# Patient Record
Sex: FEMALE | Race: BLACK OR AFRICAN AMERICAN | NOT HISPANIC OR LATINO | Employment: UNEMPLOYED | URBAN - METROPOLITAN AREA
[De-identification: names, ages, dates, MRNs, and addresses within clinical notes are randomized per-mention and may not be internally consistent; named-entity substitution may affect disease eponyms.]

---

## 2024-11-15 ENCOUNTER — CLINICAL SUPPORT (OUTPATIENT)
Dept: EMERGENCY MEDICINE | Facility: HOSPITAL | Age: 24
End: 2024-11-15
Payer: MEDICAID

## 2024-11-15 ENCOUNTER — HOSPITAL ENCOUNTER (OUTPATIENT)
Facility: HOSPITAL | Age: 24
Setting detail: OBSERVATION
Discharge: PSYCHIATRIC OR SUBSTANCE USE FACILITY | End: 2024-11-16
Attending: STUDENT IN AN ORGANIZED HEALTH CARE EDUCATION/TRAINING PROGRAM | Admitting: STUDENT IN AN ORGANIZED HEALTH CARE EDUCATION/TRAINING PROGRAM
Payer: MEDICAID

## 2024-11-15 ENCOUNTER — APPOINTMENT (OUTPATIENT)
Dept: RADIOLOGY | Facility: HOSPITAL | Age: 24
End: 2024-11-15
Payer: MEDICAID

## 2024-11-15 VITALS
HEART RATE: 70 BPM | SYSTOLIC BLOOD PRESSURE: 101 MMHG | RESPIRATION RATE: 16 BRPM | DIASTOLIC BLOOD PRESSURE: 57 MMHG | OXYGEN SATURATION: 99 % | TEMPERATURE: 98.1 F

## 2024-11-15 DIAGNOSIS — T14.91XA SUICIDE ATTEMPT (MULTI): Primary | ICD-10-CM

## 2024-11-15 LAB
ALBUMIN SERPL BCP-MCNC: 4.3 G/DL (ref 3.4–5)
ALP SERPL-CCNC: 52 U/L (ref 33–110)
ALT SERPL W P-5'-P-CCNC: 7 U/L (ref 7–45)
AMPHETAMINES UR QL SCN: NORMAL
ANION GAP SERPL CALC-SCNC: 14 MMOL/L (ref 10–20)
APAP SERPL-MCNC: <10 UG/ML
AST SERPL W P-5'-P-CCNC: 11 U/L (ref 9–39)
BARBITURATES UR QL SCN: NORMAL
BASOPHILS # BLD AUTO: 0.04 X10*3/UL (ref 0–0.1)
BASOPHILS NFR BLD AUTO: 1.1 %
BENZODIAZ UR QL SCN: NORMAL
BILIRUB SERPL-MCNC: 0.4 MG/DL (ref 0–1.2)
BUN SERPL-MCNC: 17 MG/DL (ref 6–23)
BZE UR QL SCN: NORMAL
CALCIUM SERPL-MCNC: 9.3 MG/DL (ref 8.6–10.6)
CANNABINOIDS UR QL SCN: NORMAL
CHLORIDE SERPL-SCNC: 108 MMOL/L (ref 98–107)
CO2 SERPL-SCNC: 23 MMOL/L (ref 21–32)
CREAT SERPL-MCNC: 0.82 MG/DL (ref 0.5–1.05)
EGFRCR SERPLBLD CKD-EPI 2021: >90 ML/MIN/1.73M*2
EOSINOPHIL # BLD AUTO: 0.1 X10*3/UL (ref 0–0.7)
EOSINOPHIL NFR BLD AUTO: 2.7 %
ERYTHROCYTE [DISTWIDTH] IN BLOOD BY AUTOMATED COUNT: 12.9 % (ref 11.5–14.5)
ETHANOL SERPL-MCNC: <10 MG/DL
FENTANYL+NORFENTANYL UR QL SCN: NORMAL
GLUCOSE SERPL-MCNC: 76 MG/DL (ref 74–99)
HCT VFR BLD AUTO: 39 % (ref 36–46)
HGB BLD-MCNC: 12.9 G/DL (ref 12–16)
HOLD SPECIMEN: NORMAL
HOLD SPECIMEN: NORMAL
IMM GRANULOCYTES # BLD AUTO: 0.01 X10*3/UL (ref 0–0.7)
IMM GRANULOCYTES NFR BLD AUTO: 0.3 % (ref 0–0.9)
LYMPHOCYTES # BLD AUTO: 1.95 X10*3/UL (ref 1.2–4.8)
LYMPHOCYTES NFR BLD AUTO: 52.6 %
MCH RBC QN AUTO: 28.8 PG (ref 26–34)
MCHC RBC AUTO-ENTMCNC: 33.1 G/DL (ref 32–36)
MCV RBC AUTO: 87 FL (ref 80–100)
METHADONE UR QL SCN: NORMAL
MONOCYTES # BLD AUTO: 0.41 X10*3/UL (ref 0.1–1)
MONOCYTES NFR BLD AUTO: 11.1 %
NEUTROPHILS # BLD AUTO: 1.2 X10*3/UL (ref 1.2–7.7)
NEUTROPHILS NFR BLD AUTO: 32.2 %
NRBC BLD-RTO: 0 /100 WBCS (ref 0–0)
OPIATES UR QL SCN: NORMAL
OXYCODONE+OXYMORPHONE UR QL SCN: NORMAL
PCP UR QL SCN: NORMAL
PLATELET # BLD AUTO: 250 X10*3/UL (ref 150–450)
POTASSIUM SERPL-SCNC: 3.9 MMOL/L (ref 3.5–5.3)
PREGNANCY TEST URINE, POC: NEGATIVE
PROT SERPL-MCNC: 7.3 G/DL (ref 6.4–8.2)
RBC # BLD AUTO: 4.48 X10*6/UL (ref 4–5.2)
SALICYLATES SERPL-MCNC: <3 MG/DL
SODIUM SERPL-SCNC: 141 MMOL/L (ref 136–145)
TSH SERPL-ACNC: 2.2 MIU/L (ref 0.44–3.98)
WBC # BLD AUTO: 3.7 X10*3/UL (ref 4.4–11.3)

## 2024-11-15 PROCEDURE — 36415 COLL VENOUS BLD VENIPUNCTURE: CPT | Performed by: PHYSICIAN ASSISTANT

## 2024-11-15 PROCEDURE — G0378 HOSPITAL OBSERVATION PER HR: HCPCS

## 2024-11-15 PROCEDURE — 85025 COMPLETE CBC W/AUTO DIFF WBC: CPT | Performed by: PHYSICIAN ASSISTANT

## 2024-11-15 PROCEDURE — 80143 DRUG ASSAY ACETAMINOPHEN: CPT | Performed by: PHYSICIAN ASSISTANT

## 2024-11-15 PROCEDURE — 71046 X-RAY EXAM CHEST 2 VIEWS: CPT | Performed by: RADIOLOGY

## 2024-11-15 PROCEDURE — 84443 ASSAY THYROID STIM HORMONE: CPT | Performed by: STUDENT IN AN ORGANIZED HEALTH CARE EDUCATION/TRAINING PROGRAM

## 2024-11-15 PROCEDURE — 81025 URINE PREGNANCY TEST: CPT | Performed by: PHYSICIAN ASSISTANT

## 2024-11-15 PROCEDURE — 80307 DRUG TEST PRSMV CHEM ANLYZR: CPT | Performed by: PHYSICIAN ASSISTANT

## 2024-11-15 PROCEDURE — 93010 ELECTROCARDIOGRAM REPORT: CPT | Performed by: STUDENT IN AN ORGANIZED HEALTH CARE EDUCATION/TRAINING PROGRAM

## 2024-11-15 PROCEDURE — 71046 X-RAY EXAM CHEST 2 VIEWS: CPT

## 2024-11-15 PROCEDURE — 93005 ELECTROCARDIOGRAM TRACING: CPT

## 2024-11-15 PROCEDURE — 99285 EMERGENCY DEPT VISIT HI MDM: CPT | Performed by: PHYSICIAN ASSISTANT

## 2024-11-15 PROCEDURE — 80053 COMPREHEN METABOLIC PANEL: CPT | Performed by: PHYSICIAN ASSISTANT

## 2024-11-15 PROCEDURE — 99285 EMERGENCY DEPT VISIT HI MDM: CPT | Mod: 25

## 2024-11-15 SDOH — HEALTH STABILITY: MENTAL HEALTH: ACTIVE SUICIDAL IDEATION WITH SOME INTENT TO ACT, WITHOUT SPECIFIC PLAN (PAST 1 MONTH): YES

## 2024-11-15 SDOH — HEALTH STABILITY: MENTAL HEALTH: SUICIDE ASSESSMENT: ADULT (C-SSRS)

## 2024-11-15 SDOH — HEALTH STABILITY: MENTAL HEALTH: NON-SPECIFIC ACTIVE SUICIDAL THOUGHTS (PAST 1 MONTH): YES

## 2024-11-15 SDOH — HEALTH STABILITY: MENTAL HEALTH
DEPRESSION SYMPTOMS: APPETITE CHANGE;SLEEP DISTURBANCE;FEELINGS OF HELPLESSNESS;FEELINGS OF HOPELESSESS;FEELINGS OF WORTHLESSNESS

## 2024-11-15 SDOH — HEALTH STABILITY: MENTAL HEALTH: HAVE YOU ACTUALLY HAD ANY THOUGHTS OF KILLING YOURSELF?: YES

## 2024-11-15 SDOH — HEALTH STABILITY: MENTAL HEALTH: HOW DID YOU TRY TO KILL YOURSELF?: DROWNING

## 2024-11-15 SDOH — HEALTH STABILITY: MENTAL HEALTH: WISH TO BE DEAD (PAST 1 MONTH): YES

## 2024-11-15 SDOH — HEALTH STABILITY: MENTAL HEALTH: WAS THIS WITHIN THE PAST THREE MONTHS?: YES

## 2024-11-15 SDOH — HEALTH STABILITY: MENTAL HEALTH: IN THE PAST FEW WEEKS, HAVE YOU WISHED YOU WERE DEAD?: YES

## 2024-11-15 SDOH — HEALTH STABILITY: MENTAL HEALTH
HAVE YOU STARTED TO WORK OUT OR WORKED OUT THE DETAILS OF HOW TO KILL YOURSELF? DO YOU INTENT TO CARRY OUT THIS PLAN?: YES

## 2024-11-15 SDOH — HEALTH STABILITY: MENTAL HEALTH: HAVE YOU EVER DONE ANYTHING, STARTED TO DO ANYTHING, OR PREPARED TO DO ANYTHING TO END YOUR LIFE?: YES

## 2024-11-15 SDOH — HEALTH STABILITY: MENTAL HEALTH: IN THE PAST WEEK, HAVE YOU BEEN HAVING THOUGHTS ABOUT KILLING YOURSELF?: YES

## 2024-11-15 SDOH — HEALTH STABILITY: MENTAL HEALTH

## 2024-11-15 SDOH — HEALTH STABILITY: MENTAL HEALTH: BEHAVIORAL HEALTH(WDL): EXCEPTIONS TO WDL

## 2024-11-15 SDOH — HEALTH STABILITY: MENTAL HEALTH: HAVE YOU BEEN THINKING ABOUT HOW YOU MIGHT DO THIS?: YES

## 2024-11-15 SDOH — HEALTH STABILITY: MENTAL HEALTH: HAVE YOU EVER TRIED TO KILL YOURSELF?: YES

## 2024-11-15 SDOH — HEALTH STABILITY: MENTAL HEALTH: BEHAVIORS/MOOD: CALM;SAD

## 2024-11-15 SDOH — HEALTH STABILITY: MENTAL HEALTH: SUICIDAL BEHAVIOR (LIFETIME): YES

## 2024-11-15 SDOH — HEALTH STABILITY: MENTAL HEALTH: HAVE YOU WISHED YOU WERE DEAD OR WISHED YOU COULD GO TO SLEEP AND NOT WAKE UP?: YES

## 2024-11-15 SDOH — HEALTH STABILITY: MENTAL HEALTH: ARE YOU HAVING THOUGHTS OF KILLING YOURSELF RIGHT NOW?: YES

## 2024-11-15 SDOH — SOCIAL STABILITY: SOCIAL NETWORK: PARENT/GUARDIAN/SIGNIFICANT OTHER INVOLVEMENT: ATTENTIVE TO PATIENT NEEDS

## 2024-11-15 SDOH — HEALTH STABILITY: MENTAL HEALTH: ANXIETY SYMPTOMS: GENERALIZED

## 2024-11-15 SDOH — ECONOMIC STABILITY: HOUSING INSECURITY: FEELS SAFE LIVING IN HOME: YES

## 2024-11-15 SDOH — HEALTH STABILITY: MENTAL HEALTH: SUICIDAL BEHAVIOR (3 MONTHS): YES

## 2024-11-15 SDOH — HEALTH STABILITY: MENTAL HEALTH: IN THE PAST FEW WEEKS, HAVE YOU FELT THAT YOU OR YOUR FAMILY WOULD BE BETTER OFF IF YOU WERE DEAD?: YES

## 2024-11-15 SDOH — HEALTH STABILITY: MENTAL HEALTH: HAVE YOU HAD THESE THOUGHTS AND HAD SOME INTENTION OF ACTING ON THEM?: YES

## 2024-11-15 SDOH — HEALTH STABILITY: MENTAL HEALTH: ACTIVE SUICIDAL IDEATION WITH SPECIFIC PLAN AND INTENT (PAST 1 MONTH): YES

## 2024-11-15 SDOH — HEALTH STABILITY: MENTAL HEALTH: FOR HIGH RISK PATIENTS: 1:1 PATIENT OBSERVER AT ALL TIMES

## 2024-11-15 ASSESSMENT — LIFESTYLE VARIABLES
SUBSTANCE_ABUSE_PAST_12_MONTHS: NO
HAVE YOU EVER FELT YOU SHOULD CUT DOWN ON YOUR DRINKING: NO
TOTAL SCORE: 0
PRESCIPTION_ABUSE_PAST_12_MONTHS: NO
EVER HAD A DRINK FIRST THING IN THE MORNING TO STEADY YOUR NERVES TO GET RID OF A HANGOVER: NO
HAVE PEOPLE ANNOYED YOU BY CRITICIZING YOUR DRINKING: NO
EVER FELT BAD OR GUILTY ABOUT YOUR DRINKING: NO

## 2024-11-15 ASSESSMENT — PAIN SCALES - GENERAL
PAINLEVEL_OUTOF10: 0 - NO PAIN
PAINLEVEL_OUTOF10: 0 - NO PAIN

## 2024-11-15 ASSESSMENT — COLUMBIA-SUICIDE SEVERITY RATING SCALE - C-SSRS
5. HAVE YOU STARTED TO WORK OUT OR WORKED OUT THE DETAILS OF HOW TO KILL YOURSELF? DO YOU INTEND TO CARRY OUT THIS PLAN?: YES
4. HAVE YOU HAD THESE THOUGHTS AND HAD SOME INTENTION OF ACTING ON THEM?: YES
1. IN THE PAST MONTH, HAVE YOU WISHED YOU WERE DEAD OR WISHED YOU COULD GO TO SLEEP AND NOT WAKE UP?: YES
6. HAVE YOU EVER DONE ANYTHING, STARTED TO DO ANYTHING, OR PREPARED TO DO ANYTHING TO END YOUR LIFE?: YES
2. HAVE YOU ACTUALLY HAD ANY THOUGHTS OF KILLING YOURSELF?: YES
6. HAVE YOU EVER DONE ANYTHING, STARTED TO DO ANYTHING, OR PREPARED TO DO ANYTHING TO END YOUR LIFE?: YES

## 2024-11-15 ASSESSMENT — PAIN DESCRIPTION - PROGRESSION: CLINICAL_PROGRESSION: NOT CHANGED

## 2024-11-15 ASSESSMENT — PAIN - FUNCTIONAL ASSESSMENT: PAIN_FUNCTIONAL_ASSESSMENT: 0-10

## 2024-11-15 NOTE — ED PROVIDER NOTES
"Emergency Department Encounter  [facility] EMERGENCY MEDICINE    Patient: Chaparro Gonzalez  MRN: [id number]  : 2000  Date of Evaluation: 11/15/2024  ED Provider: Radha Mckeon PA-C      Chief Complaint       Chief Complaint   Patient presents with    Suicidal     HPI    Chaparro Gonzalez is a 24 y.o. female who presents to the emergency department presenting for suicide attempt.  Patient believes that her girlfriend called 911 for her as she did not call for herself.  States that she had \"another mental health breakdown\".  Notes that she was very tearful last night, hermila a bath and attempted to drown herself in the bath, reports that she was unsuccessful.  States that she \"just could not do it\".  Also notes that she considered using razor blades but did not have access to this.  Denies any homicidal ideation, AVH.  Endorses that she has a previous suicide attempt at 21 years old after she attempted to overdose on medication.  Endorses that she has PMH of anxiety, depression and PTSD.  Does not currently take anything for her diagnoses.  Endorses that she occasionally drinks alcohol, last was 2 days ago.  Denies any illicit drug use.  Denies any chest pain, shortness of breath, cough, orthopnea, nausea or vomiting, numbness or tingling, syncope.    ROS:     Review of Systems  14 systems reviewed and otherwise acutely negative except as in the HPI.    Past History   History reviewed. No pertinent past medical history.  History reviewed. No pertinent surgical history.  Social History     Socioeconomic History    Marital status: Single       Medications/Allergies     Previous Medications    No medications on file     Not on File     Physical Exam       ED Triage Vitals [11/15/24 0930]   Temperature Heart Rate Respirations BP   36.5 °C (97.7 °F) 75 17 118/84      Pulse Ox Temp Source Heart Rate Source Patient Position   100 % Oral Monitor Sitting      BP Location FiO2 (%)     Left arm --   "       Physical Exam    Physical Exam:     VS: As documented in the triage note and EMR flowsheet from this visit were reviewed.    Appearance: Alert, oriented, in no acute distress. Tearful, cooperative.    Skin: Atraumatic. Warm, intact and dry. No lesions, rash, or petechiae.    ENT: Hearing grossly intact. Nares patent, mucus membranes moist. Pharynx clear, uvula midline and non-edematous. No tonsillar hypertrophy or exudates. Bilateral tonsillar pillars visualized and intact, non-obscured. No drooling, dysphagia or trismus. Voice non-muffled.    Neck: Supple, without meningismus. No lymphadenopathy.     Pulmonary: Clear bilaterally with good chest wall excursion. No rales, rhonchi or wheezing. No accessory muscle use or stridor. Nonlabored breathing, no supplemental oxygen.     Cardiac: Normal S1, S2 without murmur, rub, gallop or extrasystole.     Abdomen: Soft, nontender, active bowel sounds.    Musculoskeletal: Spontaneously moving all extremities without limitation. Extremities warm and well-perfused, capillary refill less than 2 seconds. Pulses full and equal.     Neurological:  Cranial nerves II through XII are grossly intact,  Ambulating without assistance with steady gait, non-ataxic.    Psychiatric: Appropriate mood and affect. Kempt appearance.     Diagnostics   Labs:  Labs Reviewed   CBC WITH AUTO DIFFERENTIAL - Abnormal       Result Value    WBC 3.7 (*)     nRBC 0.0      RBC 4.48      Hemoglobin 12.9      Hematocrit 39.0      MCV 87      MCH 28.8      MCHC 33.1      RDW 12.9      Platelets 250      Neutrophils % 32.2      Immature Granulocytes %, Automated 0.3      Lymphocytes % 52.6      Monocytes % 11.1      Eosinophils % 2.7      Basophils % 1.1      Neutrophils Absolute 1.20      Immature Granulocytes Absolute, Automated 0.01      Lymphocytes Absolute 1.95      Monocytes Absolute 0.41      Eosinophils Absolute 0.10      Basophils Absolute 0.04     COMPREHENSIVE METABOLIC PANEL - Abnormal     Glucose 76      Sodium 141      Potassium 3.9      Chloride 108 (*)     Bicarbonate 23      Anion Gap 14      Urea Nitrogen 17      Creatinine 0.82      eGFR >90      Calcium 9.3      Albumin 4.3      Alkaline Phosphatase 52      Total Protein 7.3      AST 11      Bilirubin, Total 0.4      ALT 7     ACUTE TOXICOLOGY PANEL, BLOOD - Normal    Acetaminophen <10.0      Salicylate  <3      Alcohol <10     DRUG SCREEN,URINE   TSH WITH REFLEX TO FREE T4 IF ABNORMAL   POCT PREGNANCY, URINE     Radiographs:  XR chest 2 views   Final Result   1.  No evidence of acute cardiopulmonary process.                  MACRO:   None        Signed by: Michael Suarez 11/15/2024 10:30 AM   Dictation workstation:   [id number]        EKG #1 11/15/24 1213:  NSR. No STEMI. Normal Qtc interval.      ED Course   Visit Vitals  /84 (BP Location: Left arm, Patient Position: Sitting)   Pulse 75   Temp 36.5 °C (97.7 °F) (Oral)   Resp 17   SpO2 100%     Medications - No data to display    Medical Decision Making     ED Course as of 11/15/24 1346   Fri Nov 15, 2024   1136 XR chest 2 views  No pneumothorax, pneumonia, infiltrates [FN]   1218 Electrocardiogram, 12-lead  ECG shows normal sinus rhythm, vent rate 66, no significant ST segment elevation or T wave inversion.  Narrow QRS, normal QTc [FN]      ED Course User Index  [FN] Bran Disla MD         Diagnoses as of 11/15/24 1346   Suicide attempt (Multi)   Pt belongings secured, placed on continuous observation here. Calm + cooperative, no need for chemical or physical restraint. Basic labs and EKG obtained for medical clearance for EPAT evaluation.  Staffed with supervising physician, Bran Disla MD, who agrees with workup and treatment plan.  EKG NSR. Chest x-ray without acute cardiopulmonary processes. Labs stable, pending TSH add-on, urine sample.  EPAT has evaluated and reports patient does meet inpatient psychiatric admission criteria.  Signed out pending EPAT placement -  observation status initiated, psychiatric observation H&P entered.  **EPAT reports pt does not have medical insurance - will require medical clearance (urine) and then mobile crisis eval.    Final Impression      1. Suicide attempt (Multi)          DISPOSITION  Disposition: signed out  Patient condition is: Stable    Comment: Please note this report has been produced using speech recognition software and may contain errors related to that system including errors in grammar, punctuation, and spelling, as well as words and phrases that may be inappropriate.  If there are any questions or concerns please feel free to contact the dictating provider for clarification.    LUKE Waddell PA-C  11/15/24 [id number]

## 2024-11-15 NOTE — H&P
"Observation History and Physical  [facility] EMERGENCY MEDICINE           History of Present Illness     History provided by: Patient  Limitations to History: Mental Illness  External Records Reviewed: n/a    Patient History:  Chaparro Gonzalez is a 24 y.o. female who presented to the emergency department for suicidal ideation  / attempt last night. Patient believes that her girlfriend called 911 for her as she did not call for herself. States that she had \"another mental health breakdown\". Notes that she was very tearful last night, hermila a bath and attempted to drown herself in the bath, reports that she was unsuccessful. States that she \"just could not do it\". Also notes that she considered using razor blades but did not have access to this. Denies any homicidal ideation, AVH. Endorses that she has a previous suicide attempt at 21 years old after she attempted to overdose on medication. Endorses that she has PMH of anxiety, depression and PTSD. Does not currently take anything for her diagnoses. Endorses that she occasionally drinks alcohol, last was 2 days ago. Denies any illicit drug use. Denies any chest pain, shortness of breath, cough, orthopnea, nausea or vomiting, numbness or tingling, syncope.     EPAT has evaluated and reports patient does meet inpatient psychiatric admission criteria. Awaiting confirmation of medical insurance as well as [employer]/UDS for EPAT placement.    Chaparro Gonzalez was escalated to observation status. Observation was necessary as they continue to require treatment and monitoring of their psychiatric illness while awaiting inpatient behavioral health bed availability.    Physical Exam     Visit Vitals  /84 (BP Location: Left arm, Patient Position: Sitting)   Pulse 75   Temp 36.5 °C (97.7 °F) (Oral)   Resp 17   SpO2 100%       GENERAL:  The patient appears nourished and normally developed. Vital signs as documented.     PULMONARY:  Without any respiratory " distress. Able to speak full sentences, no accessory muscle use    CARDIAC: Warm and well perfused. No cyanosis.    MUSCULOSKELETAL:   Able to ambulate, Non edematous, with no obvious deformities.     SKIN: No pallor. Intact.    NEURO:  No obvious neurological deficits.  Able to follow commands.    Psych: tearful. Continues to endorse suicidal ideation without current plan. No objective signs of attempt on physical exam.      Impression and Plan     ED Course as of 11/15/24 1325   Fri Nov 15, 2024   1136 XR chest 2 views  No pneumothorax, pneumonia, infiltrates [FN]   1218 Electrocardiogram, 12-lead  ECG shows normal sinus rhythm, vent rate 66, no significant ST segment elevation or T wave inversion.  Narrow QRS, normal QTc [FN]      ED Course User Index  [FN] Bran Disla MD         Diagnoses as of 11/15/24 1325   Suicide attempt (Multi)        Chaparro Washington under observation status in [facility] EMERGENCY MEDICINE for psychiatric illness monitoring and treatment while awaiting inpatient behavioral health bed availability.   Emergency Psychiatric Assessment Team has been consulted. Case discussed with them and decision for inpatient hospitalization deemed necessary. Patient is medically clear at this time.     Home medication reconciliation was reviewed and restarted where clinically indicated.     Patient and Family updated on plan of care.     Radha Mckeon PA-C

## 2024-11-15 NOTE — PROGRESS NOTES
Observation Disposition Note  [facility] EMERGENCY MEDICINE             Subjective:       Chaparro Gonzalez has been under observation status in [facility] EMERGENCY MEDICINE for psychiatric illness monitoring and treatment while awaiting inpatient behavioral health bed availability.       Physical Exam     Visit Vitals  /84 (BP Location: Left arm, Patient Position: Sitting)   Pulse 75   Temp 36.5 °C (97.7 °F) (Oral)   Resp 17   SpO2 100%       GENERAL:  The patient appears nourished and normally developed. Vital signs as documented.     PULMONARY:  Without any respiratory distress. Able to speak full sentences, no accessory muscle use    CARDIAC: Warm and well perfused. No cyanosis.    MUSCULOSKELETAL:   Able to ambulate, Non edematous, with no obvious deformities.     SKIN: No pallor. Intact.    NEURO:  No obvious neurological deficits.  Able to follow commands.    Psych: Labile, withdrawn      Impresssion and Plan     ED Course as of 11/15/24 1726   Fri Nov 15, 2024   1136 XR chest 2 views  No pneumothorax, pneumonia, infiltrates [FN]   1218 Electrocardiogram, 12-lead  ECG shows normal sinus rhythm, vent rate 66, no significant ST segment elevation or T wave inversion.  Narrow QRS, normal QTc [FN]      ED Course User Index  [FN] Bran Disla MD         Diagnoses as of 11/15/24 1726   Suicide attempt (Multi)       In summary, Chaparro Gonzalez has been cared under observation in [facility] for psychiatric illness monitoring and treatment while awaiting inpatient behavioral health bed availability.     Emergency Psychiatric Assessment Team was consulted. Case discussed with them and decision for inpatient hospitalization deemed necessary.     Patient has been accepted at  [facility]    Total length of observation was 8 hours. Dr. Stephan Silvestre MD is the disposition attending.    Discharge Diagnosis  Suicide attempt  (Multi)    Elena Flores, APRN-CNP

## 2024-11-15 NOTE — ED TRIAGE NOTES
"Pt came in via squad with SI, pt states that she tried to drown herself last night and stated \"I couldn't bring myself to do it\" she then attempted to use a razor blade on her wrists and stated that she couldn't bring herself to do that either. Pt states having these thoughts for the past couple of weeks but they got worse yesterday.   "

## 2024-11-15 NOTE — PROGRESS NOTES
EPAT - Social Work Psychiatric Assessment    Arrival Details  Mode of Arrival: Ambulatory  Admission Source: Home  Admission Type: Involuntary  EPAT Assessment Start Date: 11/15/24  EPAT Assessment Start Time: 1300  Name of : Reuben Schroeder    History of Present Illness  Admission Reason: Suicide attempt  HPI: Patient is a 24 year old female who came to the ED via EMS. Her girlfriend called concerned after the patient reports she tried to kill herself last night. The patient reports she attempted to drown herself. She has a history of past attempts at age 21 and 22. A review of her Triage and Provider note was conducted.    SW Readmission Information   Readmission within 30 Days: No    Psychiatric Symptoms  Anxiety Symptoms: Generalized  Depression Symptoms: Appetite change, Sleep disturbance, Feelings of helplessness, Feelings of hopelessess, Feelings of worthlessness  Cesilia Symptoms: No problems reported or observed.    Psychosis Symptoms  Hallucination Type: No problems reported or observed.  Delusion Type: No problems reported or observed.    Additional Symptoms - Adult  Generalized Anxiety Disorder: Excessive anxiety/worry  Obsessive Compulsive Disorder: No problems reported or observed.  Panic Attack: No problems reported or observed.  Post Traumatic Stress Disorder: No problems reported or observed.  Review of Symptoms Comments:  (Please see above)    Past Psychiatric History/Meds/Treatments  Past Psychiatric History: Patient has a history of Depression and anxiety. She currently does not see any providers and does not take any medications. She denies any substance treatment history. She reports two prior suicide attempts and was hospitalized in [place] at that time.  Past Psychiatric Meds/Treatments: none  Past Violence/Victimization History: patient denies    Current Mental Health Contacts   Name/Phone Number: none   Last Appointment Date: none  Provider Name/Phone Number:  none  Provider Last Appointment Date: none    Support System:  (girlfriend)    Living Arrangement: Apartment    Home Safety  Feels Safe Living in Home: Yes         [place] Service/Education History  Current or Previous  Service: None  Education Level: Less than high school  History of Learning Problems: No  History of School Behavior Problems: No  School History: 10th grade    Social/Cultural History  Social History: Patient is her own guardian.  Important Activities: Family    Legal  Legal Concerns: none    Drug Screening  Have you used any substances (canabis, cocaine, heroin, hallucinogens, inhalants, etc.) in the past 12 months?: No  Have you used any prescription drugs other than prescribed in the past 12 months?: No  Is a toxicology screen needed?: No         Behavioral Health  Behavioral Health(WDL): Exceptions to WDL  Behaviors/Mood: Anxious  Affect: Inconsistent with mood  Parent/Guardian/Significant Other Involvement: No involvement    Orientation  Orientation Level: Oriented X4    General Appearance  Motor Activity: Unremarkable  Speech Pattern: Other (Comment)  General Attitude: Cooperative, Withdrawn  Appearance/Hygiene: Disheveled    Thought Process  Coherency: Other (Comment)  Content: Unremarkable  Delusions: Other (Comment)  Perception: Not altered  Hallucination: None  Judgment/Insight: Poor  Confusion: None  Cognition: Poor judgement         Risk Factors  Self Harm/Suicidal Ideation Plan: Suicide attempt  Previous Self Harm/Suicidal Plans: two prior attempts  Risk Factors: Unemployment    Violence Risk Assessment  Assessment of Violence: None noted  Thoughts of Harm to Others: No    Ability to Assess Risk Screen  Risk Screen - Ability to Assess: Other (Comment)  Ask Suicide-Screening Questions  1. In the past few weeks, have you wished you were dead?: Yes  2. In the past few weeks, have you felt that you or your family would be better off if you were dead?: Yes  3. In the past week, have  you been having thoughts about killing yourself?: Yes  4. Have you ever tried to kill yourself?: Yes  How did you try to kill yourself?: drowning  When did you try to kill yourself?: yesterday  5. Are you having thoughts of killing yourself right now?: Yes  Calculated Risk Score: Imminent Risk  [place] Suicide Severity Rating Scale (Screener/Recent Self-Report)  1. Wish to be Dead (Past 1 Month): Yes  2. Non-Specific Active Suicidal Thoughts (Past 1 Month): Yes  3. Active Suicidal Ideation with any Methods (Not Plan) Without Intent to Act (Past 1 Month): Yes  4. Active Suicidal Ideation with Some Intent to Act, Without Specific Plan (Past 1 Month): Yes  5. Active Suicidal Ideation with Specific Plan and Intent (Past 1 Month): Yes  6. Suicidal Behavior (Lifetime): Yes  6. Suicidal Behavior (3 Months): Yes  Calculated C-SSRS Risk Score (Lifetime/Recent): High Risk  Step 1: Risk Factors  Current & Past Psychiatric Dx: Mood disorder  Presenting Symptoms: Hopelessness or despair  Family History: Other (Comment)  Precipitants/Stressors: Triggering events leading to humiliation, shame, and/or despair (e.g. loss of relationship, financial or health status) (real or anticipated)  Change in Treatment: Non-compliant or not receiving treatment  Access to Lethal Methods : No  Step 2: Protective Factors   Protective Factors Internal: Other (Comment)  Protective Factors External: Cultural, spiritual and/or moral attitudes against suicide  Step 3: Suicidal Ideation Intensity  How Many Times Have You Had These Thoughts: 2-5 times in a week  When You Have the Thoughts How Long do They Last : 1-4 hours/a lot of the time  Could/Can You Stop Thinking About Killing Yourself or Wanting to Die if You Want to: Can control thoughts with some difficulty  Are There Things - Anyone or Anything - That Stopped You From Wanting to Die or Acting on: Uncertain that deterrents stopped you  What Sort of Reasons Did You Have For Thinking About Wanting  "to Die or Killing Yourself: Equally to get attention, revenge or a reaction from others and to end/stop the pain  Total Score: 15  Step 5: Documentation  Risk Level: Moderate suicide risk, High suicide risk (Patient is high risk if she is discharged but moderate risk if admitted. NP Mike agrees.)    Psychiatric Impression and Plan of Care  Assessment and Plan: Patient is a 24 year old female with a history of Depression and Anxiety. She was brought to the ED today after she told her girlfriend  her suicide attempt. Last night the patient stated she tried to drown herself with intent to die. She states she lost her job one month ago, has two eviction notices and \"have not way to dig myself out\". She appeared flat and withdrawn. She has poor insight/judgement. She reports prior attempts as an adult when she lived in [place]. She shared she was hospitalized those incidents. She does not take medications. She reports poor sleep and low energy. She continues to endorse suicidal thoughts with a plan. The patient states she has not had providers since her move from [place] two years ago. She lives by herself. She has minimal supports and poor coping skills. She denied any homicidal thoughts or hallucinations.  Specific Resources Provided to Patient: inpatient  CM Notified: none  PHP/IOP Recommended: none  Specific Information Provided for PHP/IOP: none  Plan Comments: inpatient    Outcome/Disposition  Patient's Perception of Outcome Achieved: n/a  Assessment, Recommendations and Risk Level Reviewed with: inpatient  Contact Name: none  Contact Number(s): none  Contact Relationship: none  EPAT Assessment Completed Date: 11/15/24  EPAT Assessment Completed Time: 1356  Patient Disposition: Home      "

## 2024-11-16 ENCOUNTER — HOSPITAL ENCOUNTER (INPATIENT)
Facility: HOSPITAL | Age: 24
End: 2024-11-16
Attending: PSYCHIATRY & NEUROLOGY | Admitting: PSYCHIATRY & NEUROLOGY
Payer: MEDICAID

## 2024-11-16 DIAGNOSIS — F33.1 MODERATE EPISODE OF RECURRENT MAJOR DEPRESSIVE DISORDER: Primary | ICD-10-CM

## 2024-11-16 LAB
ATRIAL RATE: 66 BPM
P AXIS: 49 DEGREES
P OFFSET: 176 MS
P ONSET: 128 MS
PR INTERVAL: 204 MS
Q ONSET: 230 MS
QRS COUNT: 11 BEATS
QRS DURATION: 74 MS
QT INTERVAL: 384 MS
QTC CALCULATION(BAZETT): 402 MS
QTC FREDERICIA: 396 MS
R AXIS: 72 DEGREES
T AXIS: 41 DEGREES
T OFFSET: 422 MS
VENTRICULAR RATE: 66 BPM

## 2024-11-16 PROCEDURE — 99222 1ST HOSP IP/OBS MODERATE 55: CPT | Performed by: PSYCHIATRY & NEUROLOGY

## 2024-11-16 PROCEDURE — 99253 IP/OBS CNSLTJ NEW/EST LOW 45: CPT

## 2024-11-16 PROCEDURE — 97150 GROUP THERAPEUTIC PROCEDURES: CPT | Mod: GO

## 2024-11-16 PROCEDURE — 97165 OT EVAL LOW COMPLEX 30 MIN: CPT | Mod: GO

## 2024-11-16 PROCEDURE — 2500000001 HC RX 250 WO HCPCS SELF ADMINISTERED DRUGS (ALT 637 FOR MEDICARE OP): Performed by: PSYCHIATRY & NEUROLOGY

## 2024-11-16 PROCEDURE — 1240000001 HC SEMI-PRIVATE BH ROOM DAILY

## 2024-11-16 PROCEDURE — 97530 THERAPEUTIC ACTIVITIES: CPT | Mod: GO

## 2024-11-16 PROCEDURE — G0378 HOSPITAL OBSERVATION PER HR: HCPCS

## 2024-11-16 RX ORDER — OLANZAPINE 10 MG/2ML
5 INJECTION, POWDER, FOR SOLUTION INTRAMUSCULAR EVERY 6 HOURS PRN
Status: DISCONTINUED | OUTPATIENT
Start: 2024-11-16 | End: 2024-11-20 | Stop reason: HOSPADM

## 2024-11-16 RX ORDER — TRAZODONE HYDROCHLORIDE 50 MG/1
50 TABLET ORAL NIGHTLY PRN
Status: DISCONTINUED | OUTPATIENT
Start: 2024-11-16 | End: 2024-11-20 | Stop reason: HOSPADM

## 2024-11-16 RX ORDER — DIPHENHYDRAMINE HYDROCHLORIDE 50 MG/ML
50 INJECTION INTRAMUSCULAR; INTRAVENOUS ONCE AS NEEDED
Status: DISCONTINUED | OUTPATIENT
Start: 2024-11-16 | End: 2024-11-20 | Stop reason: HOSPADM

## 2024-11-16 RX ORDER — OLANZAPINE 5 MG/1
5 TABLET ORAL EVERY 6 HOURS PRN
Status: DISCONTINUED | OUTPATIENT
Start: 2024-11-16 | End: 2024-11-20 | Stop reason: HOSPADM

## 2024-11-16 RX ORDER — BUPROPION HYDROCHLORIDE 100 MG/1
100 TABLET, EXTENDED RELEASE ORAL DAILY
Status: DISCONTINUED | OUTPATIENT
Start: 2024-11-16 | End: 2024-11-17

## 2024-11-16 RX ORDER — ALUMINUM HYDROXIDE, MAGNESIUM HYDROXIDE, AND SIMETHICONE 1200; 120; 1200 MG/30ML; MG/30ML; MG/30ML
30 SUSPENSION ORAL EVERY 6 HOURS PRN
Status: DISCONTINUED | OUTPATIENT
Start: 2024-11-16 | End: 2024-11-20 | Stop reason: HOSPADM

## 2024-11-16 RX ORDER — HYDROXYZINE HYDROCHLORIDE 25 MG/1
50 TABLET, FILM COATED ORAL EVERY 6 HOURS PRN
Status: DISCONTINUED | OUTPATIENT
Start: 2024-11-16 | End: 2024-11-20 | Stop reason: HOSPADM

## 2024-11-16 RX ORDER — IBUPROFEN 400 MG/1
400 TABLET ORAL EVERY 6 HOURS PRN
Status: DISCONTINUED | OUTPATIENT
Start: 2024-11-16 | End: 2024-11-20 | Stop reason: HOSPADM

## 2024-11-16 RX ORDER — DIPHENHYDRAMINE HCL 25 MG
50 TABLET ORAL EVERY 6 HOURS PRN
Status: DISCONTINUED | OUTPATIENT
Start: 2024-11-16 | End: 2024-11-20 | Stop reason: HOSPADM

## 2024-11-16 RX ORDER — ACETAMINOPHEN 325 MG/1
650 TABLET ORAL EVERY 4 HOURS PRN
Status: DISCONTINUED | OUTPATIENT
Start: 2024-11-16 | End: 2024-11-20 | Stop reason: HOSPADM

## 2024-11-16 RX ORDER — ADHESIVE BANDAGE
10 BANDAGE TOPICAL DAILY PRN
Status: DISCONTINUED | OUTPATIENT
Start: 2024-11-16 | End: 2024-11-20 | Stop reason: HOSPADM

## 2024-11-16 RX ADMIN — BUPROPION HYDROCHLORIDE 100 MG: 100 TABLET, EXTENDED RELEASE ORAL at 08:28

## 2024-11-16 SDOH — SOCIAL STABILITY: SOCIAL INSECURITY: DO YOU FEEL ANYONE HAS EXPLOITED OR TAKEN ADVANTAGE OF YOU FINANCIALLY OR OF YOUR PERSONAL PROPERTY?: NO

## 2024-11-16 SDOH — ECONOMIC STABILITY: GENERAL: FINANCIAL CONCERNS: UNABLE TO PAY BILLS;UNABLE TO AFFORD FOOD;TRANSPORTATION COSTS;UNABLE TO MEET BASIC NEEDS

## 2024-11-16 SDOH — SOCIAL STABILITY: SOCIAL INSECURITY: ABUSE: ADULT

## 2024-11-16 SDOH — SOCIAL STABILITY: SOCIAL INSECURITY
WITHIN THE LAST YEAR, HAVE YOU BEEN RAPED OR FORCED TO HAVE ANY KIND OF SEXUAL ACTIVITY BY YOUR PARTNER OR EX-PARTNER?: NO

## 2024-11-16 SDOH — SOCIAL STABILITY: SOCIAL INSECURITY: WITHIN THE LAST YEAR, HAVE YOU BEEN AFRAID OF YOUR PARTNER OR EX-PARTNER?: NO

## 2024-11-16 SDOH — HEALTH STABILITY: MENTAL HEALTH: ANXIETY SYMPTOMS: GENERALIZED

## 2024-11-16 SDOH — SOCIAL STABILITY: SOCIAL INSECURITY
WITHIN THE LAST YEAR, HAVE YOU BEEN KICKED, HIT, SLAPPED, OR OTHERWISE PHYSICALLY HURT BY YOUR PARTNER OR EX-PARTNER?: NO

## 2024-11-16 SDOH — ECONOMIC STABILITY: INCOME INSECURITY
IN THE PAST 12 MONTHS HAS THE ELECTRIC, GAS, OIL, OR WATER COMPANY THREATENED TO SHUT OFF SERVICES IN YOUR HOME?: ALREADY SHUT OFF

## 2024-11-16 SDOH — ECONOMIC STABILITY: FOOD INSECURITY
WITHIN THE PAST 12 MONTHS, YOU WORRIED THAT YOUR FOOD WOULD RUN OUT BEFORE YOU GOT THE MONEY TO BUY MORE.: SOMETIMES TRUE

## 2024-11-16 SDOH — SOCIAL STABILITY: SOCIAL INSECURITY: WERE YOU ABLE TO COMPLETE ALL THE BEHAVIORAL HEALTH SCREENINGS?: YES

## 2024-11-16 SDOH — SOCIAL STABILITY: SOCIAL INSECURITY: HAS ANYONE EVER THREATENED TO HURT YOUR FAMILY OR YOUR PETS?: NO

## 2024-11-16 SDOH — HEALTH STABILITY: MENTAL HEALTH: HOW DID YOU TRY TO KILL YOURSELF?: MEDICATIONS, DROWNING SELF IN TUB

## 2024-11-16 SDOH — SOCIAL STABILITY: SOCIAL INSECURITY: DO YOU FEEL UNSAFE GOING BACK TO THE PLACE WHERE YOU ARE LIVING?: NO

## 2024-11-16 SDOH — ECONOMIC STABILITY: FOOD INSECURITY: WITHIN THE PAST 12 MONTHS, THE FOOD YOU BOUGHT JUST DIDN'T LAST AND YOU DIDN'T HAVE MONEY TO GET MORE.: SOMETIMES TRUE

## 2024-11-16 SDOH — HEALTH STABILITY: MENTAL HEALTH: IN THE PAST FEW WEEKS, HAVE YOU WISHED YOU WERE DEAD?: YES

## 2024-11-16 SDOH — HEALTH STABILITY: MENTAL HEALTH: HAVE YOU EVER TRIED TO KILL YOURSELF?: YES

## 2024-11-16 SDOH — HEALTH STABILITY: MENTAL HEALTH: WHEN DID YOU TRY TO KILL YOURSELF?: AGE 19

## 2024-11-16 SDOH — ECONOMIC STABILITY: HOUSING INSECURITY: FEELS SAFE LIVING IN HOME: YES

## 2024-11-16 SDOH — SOCIAL STABILITY: SOCIAL INSECURITY: HAVE YOU HAD ANY THOUGHTS OF HARMING ANYONE ELSE?: NO

## 2024-11-16 SDOH — SOCIAL STABILITY: SOCIAL INSECURITY: ARE YOU OR HAVE YOU BEEN THREATENED OR ABUSED PHYSICALLY, EMOTIONALLY, OR SEXUALLY BY ANYONE?: NO

## 2024-11-16 SDOH — SOCIAL STABILITY: SOCIAL INSECURITY: WITHIN THE LAST YEAR, HAVE YOU BEEN HUMILIATED OR EMOTIONALLY ABUSED IN OTHER WAYS BY YOUR PARTNER OR EX-PARTNER?: NO

## 2024-11-16 SDOH — SOCIAL STABILITY: SOCIAL INSECURITY: HAVE YOU HAD THOUGHTS OF HARMING ANYONE ELSE?: NO

## 2024-11-16 SDOH — SOCIAL STABILITY: SOCIAL INSECURITY: ARE THERE ANY APPARENT SIGNS OF INJURIES/BEHAVIORS THAT COULD BE RELATED TO ABUSE/NEGLECT?: NO

## 2024-11-16 SDOH — HEALTH STABILITY: MENTAL HEALTH: IN THE PAST FEW WEEKS, HAVE YOU FELT THAT YOU OR YOUR FAMILY WOULD BE BETTER OFF IF YOU WERE DEAD?: NO

## 2024-11-16 SDOH — HEALTH STABILITY: MENTAL HEALTH
DEPRESSION SYMPTOMS: CHANGE IN ENERGY LEVEL;CRYING;FEELINGS OF HELPLESSNESS;FEELINGS OF HOPELESSESS;FEELINGS OF WORTHLESSNESS;IMPAIRED CONCENTRATION;ISOLATIVE;LOSS OF INTEREST

## 2024-11-16 SDOH — HEALTH STABILITY: MENTAL HEALTH: ARE YOU HAVING THOUGHTS OF KILLING YOURSELF RIGHT NOW?: NO

## 2024-11-16 SDOH — SOCIAL STABILITY: SOCIAL INSECURITY: DOES ANYONE TRY TO KEEP YOU FROM HAVING/CONTACTING OTHER FRIENDS OR DOING THINGS OUTSIDE YOUR HOME?: NO

## 2024-11-16 SDOH — HEALTH STABILITY: MENTAL HEALTH: IN THE PAST WEEK, HAVE YOU BEEN HAVING THOUGHTS ABOUT KILLING YOURSELF?: YES

## 2024-11-16 ASSESSMENT — PATIENT HEALTH QUESTIONNAIRE - PHQ9
1. LITTLE INTEREST OR PLEASURE IN DOING THINGS: SEVERAL DAYS
SUM OF ALL RESPONSES TO PHQ9 QUESTIONS 1 & 2: 2
2. FEELING DOWN, DEPRESSED OR HOPELESS: SEVERAL DAYS

## 2024-11-16 ASSESSMENT — LIFESTYLE VARIABLES
SUBSTANCE_ABUSE_PAST_12_MONTHS: NO
CIWA TOTAL SCORE: 1
SUBSTANCE_ABUSE_PAST_12_MONTHS: NO
AUDIT-C TOTAL SCORE: 4
VISUAL DISTURBANCES: NOT PRESENT
HOW MANY STANDARD DRINKS CONTAINING ALCOHOL DO YOU HAVE ON A TYPICAL DAY: 5 OR 6
HOW OFTEN DURING THE LAST YEAR HAVE YOU NEEDED AN ALCOHOLIC DRINK FIRST THING IN THE MORNING TO GET YOURSELF GOING AFTER A NIGHT OF HEAVY DRINKING: NEVER
HEADACHE, FULLNESS IN HEAD: NOT PRESENT
PAROXYSMAL SWEATS: NO SWEAT VISIBLE
HOW OFTEN DO YOU HAVE 6 OR MORE DRINKS ON ONE OCCASION: LESS THAN MONTHLY
NAUSEA AND VOMITING: NO NAUSEA AND NO VOMITING
HAS A RELATIVE, FRIEND, DOCTOR, OR ANOTHER HEALTH PROFESSIONAL EXPRESSED CONCERN ABOUT YOUR DRINKING OR SUGGESTED YOU CUT DOWN: NO
HAVE YOU OR SOMEONE ELSE BEEN INJURED AS A RESULT OF YOUR DRINKING: NO
HOW OFTEN DURING THE LAST YEAR HAVE YOU FOUND THAT YOU WERE NOT ABLE TO STOP DRINKING ONCE YOU HAD STARTED: NEVER
TREMOR: NO TREMOR
SKIP TO QUESTIONS 9-10: 0
ANXIETY: MILDLY ANXIOUS
AUDIT-C TOTAL SCORE: 4
AGITATION: NORMAL ACTIVITY
TOTAL_SCORE: 1
HOW OFTEN DURING THE LAST YEAR HAVE YOU HAD A FEELING OF GUILT OR REMORSE AFTER DRINKING: NEVER
AUDIT TOTAL SCORE: 0
HOW OFTEN DO YOU HAVE A DRINK CONTAINING ALCOHOL: MONTHLY OR LESS
ORIENTATION AND CLOUDING OF SENSORIUM: ORIENTED AND CAN DO SERIAL ADDITIONS
HOW OFTEN DURING THE LAST YEAR HAVE YOU BEEN UNABLE TO REMEMBER WHAT HAPPENED THE NIGHT BEFORE BECAUSE YOU HAD BEEN DRINKING: NEVER
AUDIT TOTAL SCORE: 4
AUDITORY DISTURBANCES: NOT PRESENT
HOW OFTEN DURING THE LAST YEAR HAVE YOU FAILED TO DO WHAT WAS NORMALLY EXPECTED FROM YOU BECAUSE OF DRINKING: NEVER
PRESCIPTION_ABUSE_PAST_12_MONTHS: NO
PRESCIPTION_ABUSE_PAST_12_MONTHS: NO

## 2024-11-16 ASSESSMENT — COLUMBIA-SUICIDE SEVERITY RATING SCALE - C-SSRS
2. HAVE YOU ACTUALLY HAD ANY THOUGHTS OF KILLING YOURSELF?: NO
6. HAVE YOU EVER DONE ANYTHING, STARTED TO DO ANYTHING, OR PREPARED TO DO ANYTHING TO END YOUR LIFE?: NO
6. HAVE YOU EVER DONE ANYTHING, STARTED TO DO ANYTHING, OR PREPARED TO DO ANYTHING TO END YOUR LIFE?: NO
2. HAVE YOU ACTUALLY HAD ANY THOUGHTS OF KILLING YOURSELF?: NO
1. SINCE LAST CONTACT, HAVE YOU WISHED YOU WERE DEAD OR WISHED YOU COULD GO TO SLEEP AND NOT WAKE UP?: NO
1. SINCE LAST CONTACT, HAVE YOU WISHED YOU WERE DEAD OR WISHED YOU COULD GO TO SLEEP AND NOT WAKE UP?: NO
6. HAVE YOU EVER DONE ANYTHING, STARTED TO DO ANYTHING, OR PREPARED TO DO ANYTHING TO END YOUR LIFE?: NO
2. HAVE YOU ACTUALLY HAD ANY THOUGHTS OF KILLING YOURSELF?: NO
1. SINCE LAST CONTACT, HAVE YOU WISHED YOU WERE DEAD OR WISHED YOU COULD GO TO SLEEP AND NOT WAKE UP?: NO

## 2024-11-16 ASSESSMENT — ACTIVITIES OF DAILY LIVING (ADL)
LACK_OF_TRANSPORTATION: YES
WALKS IN HOME: INDEPENDENT
FEEDING YOURSELF: INDEPENDENT
DRESSING YOURSELF: INDEPENDENT
HEARING - LEFT EAR: FUNCTIONAL
TOILETING: INDEPENDENT
GROOMING: INDEPENDENT
JUDGMENT_ADEQUATE_SAFELY_COMPLETE_DAILY_ACTIVITIES: YES
HEARING - RIGHT EAR: FUNCTIONAL
ADEQUATE_TO_COMPLETE_ADL: YES
BATHING: INDEPENDENT
PATIENT'S MEMORY ADEQUATE TO SAFELY COMPLETE DAILY ACTIVITIES?: YES

## 2024-11-16 ASSESSMENT — PAIN - FUNCTIONAL ASSESSMENT
PAIN_FUNCTIONAL_ASSESSMENT: 0-10

## 2024-11-16 ASSESSMENT — PAIN SCALES - GENERAL
PAINLEVEL_OUTOF10: 0 - NO PAIN

## 2024-11-16 NOTE — CARE PLAN
The patient's goals for the shift include Patient reports goal is to feel better.    The clinical goals for the shift include Patient will actively particapte in treatment plan.    Patient denies SI, HI, A/VH. Patients mood is anxious and depressed, affect flat. Patient somewhat isolative to self and room at times but is also observed to be on the therapeutic milieu for group therapy. Patient out to dayroom for all meals with report of good appetite. Patient compliant with all scheduled medications. Patient able to make needs known. Care ongoing.       Problem: Potential for Harm to Self or Others  Goal: Participates in unit activities  Outcome: Progressing  Goal: Patient/Family participate in treatment and discharge plans  Outcome: Progressing  Goal: Identifies deescalation techniques  Outcome: Progressing  Goal: Understands least restrictive measures  Outcome: Progressing  Goal: Identifies stressors that lead to harmful behaviors  Outcome: Progressing  Goal: Notifies staff when experiencing harmful thoughts toward self/others  Outcome: Progressing  Goal: Denies harm toward self or others  Outcome: Progressing  Goal: Free from restraint events  Outcome: Progressing     Problem: Educational/Scholastic Disruption  Goal: Meets educational requirements during hospitalization  Outcome: Progressing  Goal: Attends class without disruptive behavior  Outcome: Progressing  Goal: Completes daily assignments  Outcome: Progressing     Problem: Ineffective Coping  Goal: Identifies ineffective coping skills  Outcome: Progressing  Goal: Identifies healthy coping skills  Outcome: Progressing  Goal: Demonstrates healthy coping skills  Outcome: Progressing  Goal: Participates in unit activities  Outcome: Progressing     Problem: Alteration in Sleep  Goal: STG - Reports nightly sleep, duration, and quality  Outcome: Progressing  Goal: STG - Identifies sleep hygiene aids  Outcome: Progressing  Goal: STG - Informs staff if unable to  sleep  Outcome: Progressing     Problem: Anxiety  Goal: Attempts to manage anxiety with help  Outcome: Progressing  Goal: Verbalizes ways to manage anxiety  Outcome: Progressing  Goal: Implements measures to reduce anxiety  Outcome: Progressing     Problem: Self Care Deficit  Goal: STG - Patient completes hygiene  Outcome: Progressing  Goal: Increase group attendance  Outcome: Progressing  Goal: Accepts need for medications  Outcome: Progressing  Goal: STG - Goes to and eats meals independently in dining room 100% of time  Outcome: Progressing     Problem: Defensive Coping  Goal: Discusses and identifies healthy coping skills  Outcome: Progressing  Goal: Demonstrates healthy coping skills  Outcome: Progressing  Goal: Identifies appropriate social interaction  Outcome: Progressing  Goal: Demonstrates appropriate social interactions  Outcome: Progressing     Problem: Risk for Suicide  Goal: Accepts medications as prescribed/needed this shift  Outcome: Progressing  Goal: Identifies supports this shift  Outcome: Progressing  Goal: Makes needs known through verbalization or behaviors this shift  Outcome: Progressing  Goal: No self harm this shift  Outcome: Progressing  Goal: Read Safety Guidelines this shift  Outcome: Progressing  Goal: Complete Mental Health Safety Plan (psychiatry only) this shift  Outcome: Progressing

## 2024-11-16 NOTE — CONSULTS
Consults  Hospital medicine consulted for Adult Medical Examination and Optimization for Behavioral Health Unit    History Of Present Illness    Louise Gonzalez is a 24 year old female with PMHx including PTSD, previous suicide attempts, anxiety, and depression who [place] [facility] [place] attempt secondary to worsening depression over the past few months. Patient states that she attempted to drown herself in a bathtub but was unsuccessful. Per ED note, also considered using razor blades but did not have access to any. Believes that her girlfriend called 911 as she did not call for herself. Currently denies SI, HI, and AVH. Denies CP, SOB, abd pain, n/v/d, headache, dizziness. Admits to occasional alcohol use. Denies tobacco or illicit drug use. Patient was medically cleared for EPAT evaluation. Hospitalist team consulted for adult medical examination optimization for behavioral health unit.    Hospitalist to evaluate medical optimization for psychiatric treatment and evaluation.  Neurological evaluation completed.  No acute or chronic medical issues identified at this time that could be contributing to underlying psychiatric symptoms. Pt is medically optimized for inpatient behavioral health evaluation and treatment.    Patient examined and seen. Alert and oriented x3, explained to patient assessment, right to , and the right to decline assessment. Patient verbalized understanding and agreed to assessment without . Patient denies chest pain, shortness of breath, palpitations, abdominal pain, fever or chills.    Review of systems: 10 system were reviewed and were negative except what was mentioned in history of present illness    Past Medical History  History reviewed. No pertinent past medical history.    Surgical History  History reviewed. No pertinent surgical history.      Social History  Social History     Socioeconomic History    Marital status: Single     Spouse name: Not on file     Number of children: Not on file    Years of education: Not on file    Highest education level: Not on file   Occupational History    Not on file   Tobacco Use    Smoking status: Never    Smokeless tobacco: Never   Substance and Sexual Activity    Alcohol use: Not on file    Drug use: Not on file    Sexual activity: Not on file   Other Topics Concern    Not on file   Social History Narrative    Not on file     Social Drivers of Health     Financial Resource Strain: High Risk (11/16/2024)    Overall Financial Resource Strain (CARDIA)     Difficulty of Paying Living Expenses: Very hard   Food Insecurity: Food Insecurity Present (11/16/2024)    Hunger Vital Sign     Worried About Running Out of Food in the Last Year: Sometimes true     Ran Out of Food in the Last Year: Sometimes true   Transportation Needs: Unmet Transportation Needs (11/16/2024)    PRAPARE - Transportation     Lack of Transportation (Medical): Yes     Lack of Transportation (Non-Medical): Yes   Physical Activity: Not on file   Stress: Not on file   Social Connections: Not on file   Intimate Partner Violence: Not At Risk (11/16/2024)    Humiliation, Afraid, Rape, and Kick questionnaire     Fear of Current or Ex-Partner: No     Emotionally Abused: No     Physically Abused: No     Sexually Abused: No   Housing Stability: High Risk (11/16/2024)    Housing Stability Vital Sign     Unable to Pay for Housing in the Last Year: Yes     Number of Times Moved in the Last Year: 1     Homeless in the Last Year: No        Family History        Allergies  No Known Allergies       Physical Exam  Constitutional: Well developed, awake/alert/oriented x3, no distress, cooperative  Eyes: PERRL, EOMI, clear sclera  ENMT: mucous membranes moist, no apparent injury, no lesions seen  Head/Neck: Neck supple, no apparent injury  Respiratory/Thorax: Patent airways, normal breath sounds with good   Cardiovascular: Regular, rate and rhythm, no murmurs, 2+ equal pulses of the  extremities, normal S 1and S 2  Gastrointestinal: Nondistended, soft, non-tender,    Musculoskeletal: ROM intact  Extremities: normal extremities,  no contusions or wounds seen   Skin: warm, dry, intact except as noted   Neurological: alert/oriented x 3, speech clear, cranial nerves II through XII  grossly intact  Psychiatric: pleasant, depressed mood  Cranial Nerve Exam: II, III, IV, VI: Visual acuity within normal limits bilaterally, visual fields normal in all quadrants, ROWAN, EOMI  Cranial Nerve exam: V: Facial sensations intact bilaterally to dull, sharp, and light touch stimuli  Cranial Nerve exam VII: Facial muscle strength normal/equal bilaterally  Cranial Nerve Exam VIII: Hearing is normal bilaterally  Cranial Nerve IX,X: Palate and Uvula symmetrical, voice is normal  Cranial Nerve XI: Shoulder shrug strong, equal bilaterally  Cranial Nerve XII: Tongue moves symmetrically  Motor : Good muscle tone, Strength equal upper and lower extremities unless otherwise stated above  Cerebellar: normal gait unless otherwise stated above     Last Recorded Vitals  Visit Vitals  BP 98/55   Pulse 65   Temp 36.4 °C (97.5 °F) (Temporal)   Resp 16        Scheduled medications  buPROPion SR, 100 mg, oral, Daily  influenza, 0.5 mL, intramuscular, During hospitalization      Continuous medications     PRN medications  PRN medications: acetaminophen, alum-mag hydroxide-simeth, diphenhydrAMINE **OR** diphenhydrAMINE, hydrOXYzine HCL, ibuprofen, magnesium hydroxide, OLANZapine **OR** OLANZapine, traZODone     Relevant Results  Results for orders placed or performed during the hospital encounter of 11/15/24 (from the past 96 hours)   CBC and Auto Differential   Result Value Ref Range    WBC 3.7 (L) 4.4 - 11.3 x10*3/uL    nRBC 0.0 0.0 - 0.0 /100 WBCs    RBC 4.48 4.00 - 5.20 x10*6/uL    Hemoglobin 12.9 12.0 - 16.0 g/dL    Hematocrit 39.0 36.0 - 46.0 %    MCV 87 80 - 100 fL    MCH 28.8 26.0 - 34.0 pg    MCHC 33.1 32.0 - 36.0 g/dL     RDW 12.9 11.5 - 14.5 %    Platelets 250 150 - 450 x10*3/uL    Neutrophils % 32.2 40.0 - 80.0 %    Immature Granulocytes %, Automated 0.3 0.0 - 0.9 %    Lymphocytes % 52.6 13.0 - 44.0 %    Monocytes % 11.1 2.0 - 10.0 %    Eosinophils % 2.7 0.0 - 6.0 %    Basophils % 1.1 0.0 - 2.0 %    Neutrophils Absolute 1.20 1.20 - 7.70 x10*3/uL    Immature Granulocytes Absolute, Automated 0.01 0.00 - 0.70 x10*3/uL    Lymphocytes Absolute 1.95 1.20 - 4.80 x10*3/uL    Monocytes Absolute 0.41 0.10 - 1.00 x10*3/uL    Eosinophils Absolute 0.10 0.00 - 0.70 x10*3/uL    Basophils Absolute 0.04 0.00 - 0.10 x10*3/uL   Comprehensive Metabolic Panel   Result Value Ref Range    Glucose 76 74 - 99 mg/dL    Sodium 141 136 - 145 mmol/L    Potassium 3.9 3.5 - 5.3 mmol/L    Chloride 108 (H) 98 - 107 mmol/L    Bicarbonate 23 21 - 32 mmol/L    Anion Gap 14 10 - 20 mmol/L    Urea Nitrogen 17 6 - 23 mg/dL    Creatinine 0.82 0.50 - 1.05 mg/dL    eGFR >90 >60 mL/min/1.73m*2    Calcium 9.3 8.6 - 10.6 mg/dL    Albumin 4.3 3.4 - 5.0 g/dL    Alkaline Phosphatase 52 33 - 110 U/L    Total Protein 7.3 6.4 - 8.2 g/dL    AST 11 9 - 39 U/L    Bilirubin, Total 0.4 0.0 - 1.2 mg/dL    ALT 7 7 - 45 U/L   Acute Toxicology Panel, Blood   Result Value Ref Range    Acetaminophen <10.0 10.0 - 30.0 ug/mL    Salicylate  <3 4 - 20 mg/dL    Alcohol <10 <=10 mg/dL   TSH with reflex to Free T4 if abnormal   Result Value Ref Range    Thyroid Stimulating Hormone 2.20 0.44 - 3.98 mIU/L   Drug Screen, Urine   Result Value Ref Range    Amphetamine Screen, Urine Presumptive Negative Presumptive Negative    Barbiturate Screen, Urine Presumptive Negative Presumptive Negative    Benzodiazepines Screen, Urine Presumptive Negative Presumptive Negative    Cannabinoid Screen, Urine Presumptive Negative Presumptive Negative    Cocaine Metabolite Screen, Urine Presumptive Negative Presumptive Negative    Fentanyl Screen, Urine Presumptive Negative Presumptive Negative    Opiate Screen, Urine  Presumptive Negative Presumptive Negative    Oxycodone Screen, Urine Presumptive Negative Presumptive Negative    PCP Screen, Urine Presumptive Negative Presumptive Negative    Methadone Screen, Urine Presumptive Negative Presumptive Negative   POCT pregnancy, urine   Result Value Ref Range    Preg Test, Ur Negative Negative   Red Top   Result Value Ref Range    Extra Tube Hold for add-ons.    SST TOP   Result Value Ref Range    Extra Tube Hold for add-ons.         XR chest 2 views    Result Date: 11/15/2024  Interpreted By:  Michael Suarez, STUDY: XR CHEST 2 VIEWS;  11/15/2024 10:29 am   INDICATION: Signs/Symptoms:attempted drowning (suicide attempt) last night.     COMPARISON: None.   ACCESSION NUMBER(S): [id number]   ORDERING CLINICIAN: MEDARDO RITCHIE   FINDINGS:         CARDIOMEDIASTINAL SILHOUETTE: Cardiomediastinal silhouette is normal in size and configuration.   LUNGS: Lungs are clear.   ABDOMEN: No remarkable upper abdominal findings.   BONES: No acute osseous changes.       1.  No evidence of acute cardiopulmonary process.       MACRO: None   Signed by: Michael Suarez 11/15/2024 10:30 AM Dictation workstation:   [id number]        Assessment and Plan  No new Assessment & Plan notes have been filed under this hospital service since the last note was generated.  Service: Internal Medicine       Principal Problem:    Depression       PLAN  Suicidal Ideation  Depression  Admit to Behavioral Health Unit  Contract for Safety   Safety Protocols  Medications to be determined by psychiatry   Vital Signs BID  Group Therapy as appropriate  Social Work for outpatient therapy   Encourage Healthy Lifestyle and Exercise as appropriate    Hypotension  No history of hypotension on chart review, does not take medications  Encourage PO fluid intake  Vital Signs BID  Most likely due to age, asymptomatic currently, re-consult medicine if there are any acute changes    Medicine to Sign off  Hemodynamically stable at  this time  Reviewed labs  Please call if acute needs or requested assessment is needed      Chrissy Iverson PA-C    A total of 45 minutes was spent on this visit reviewing labs, seeing the patient, and chart review    Plan of care was discussed extensively with patient. Patient verbalized understanding through teach back method. All questions and concerns addressed upon examination.     Of note, this documentation is completed using the Dragon Dictation system (voice recognition software). There may be spelling and/or grammatical errors that were not corrected prior to final submission.

## 2024-11-16 NOTE — PROGRESS NOTES
Occupational Therapy     REHAB Therapy Treatment    Patient Name: Chaparro Gonzalez  MRN: [id number]  Today's Date: 11/16/2024    Time in 12:20  Time out 12:40  Activity Assessment:   Pt demonstrated good attentiveness to  activity demonstrating insight into areas requiring self improvement with good eye contact and absent suicidal ideation    OT Interventions group/1:1 : Therapeutic use of self/activities, OT Task Skills Group, OT Life Skills Management Skills, Grief Management/Anger Management ,  ADL/I ADL  , Positive Coping Skills/Stress Management, Community Re-Entry, Relaxation, Self Esteem, Communication Skills, Time Management Skills, Addiction education/prevention        Attendance:  Attendance  Activity: Discussion/reminisce (Life role balance)  Participation: Active participation    Therapeutic Recreation:  Treatment Approach  Approach :  (20 min)  Patient Stated Goals: To not have a mental breakdown  Emotional: Mood, Stress  Stress Management/Relaxation Training: Identifies benefits of stress management/relaxation techniques  Treatment Approach Comments: Pt demonstrated good comprehension of of life role balance handout  identifying areas to focus to promote wellness: Nutrition/diet,Stress Mastery, Social, family & relationships, Work & career, Financial health, Environment Nature. emotions and self esteem , spirituality and intuition.Pt demonstrated good interaction without verbalizatio of depression/suicide. No suicidal ideation was noted. Good eye contact was noted.      Encounter Problems       Encounter Problems (Active)       impaired functional daily living skills       Communication/Interaction Skills (Self-expression/social Behavior/assertive) Explore/demonstrate 1-2 effective methods of expressing feelings/wants/needs (can include assertion/engaging/sharing/asking) prior to discharge (Progressing)       Start:  11/16/24    Expected End:  12/14/24            Coping Skills Explore/identify 1-2  effective strategies of expressing feelings, wants, needs(can include assertion, engaging, sharing, asking) prior to discharge (Progressing)       Start:  11/16/24    Expected End:  12/14/24             Emotion Regulation/self control Pt will exhibit appropriate range, regulation of emotions, impulse control, frustration tolerance in OT groups prior to discharge.  (Progressing)       Start:  11/16/24    Expected End:  12/14/24                     Education Documentation  Handouts, taught by Geeta Barajas OT at 11/16/2024  4:09 PM.  Learner: Patient  Readiness: Acceptance  Method: Demonstration  Response: Demonstrated Understanding, Needs Reinforcement    Precautions, taught by Geeta Barajas OT at 11/16/2024  4:09 PM.  Learner: Patient  Readiness: Acceptance  Method: Demonstration  Response: Demonstrated Understanding, Needs Reinforcement    Handouts, taught by Geeta Barajas OT at 11/16/2024  3:13 PM.  Learner: Patient  Readiness: Acceptance  Method: Demonstration  Response: Demonstrated Understanding, Needs Reinforcement    Precautions, taught by Geeta Barajas OT at 11/16/2024  3:13 PM.  Learner: Patient  Readiness: Acceptance  Method: Demonstration  Response: Demonstrated Understanding, Needs Reinforcement    Education Comments  No comments found.          Additional Comments:

## 2024-11-16 NOTE — H&P
"  The reason for admission includes: \"I wanted to die\".  Onset of symptoms was over 2 months with patient tried to drown herself Thursday night course since that time. Psychosocial Stressors: financial.        History Of Present Illness    Louise is a 24 y.o. female with history of depression and 2 previous suicide attempts at age 21 and 22 tried to drown herself in the bathtub on Thursday night after battling depression over the last 2 months.    Louise on assessment this morning described her mood as depressed.  Only now she is feeling glad that she did not die about when her suicide attempt did not succeed she was upset.  She has a lot of financial struggles going on right now.  She works at [employer] and had not worked for well over a month and is battling with eviction notices.  She lives by herself and 1 support system is her girlfriend in [facility].  Rest of her family is in [place].  She has been experiencing severe depression although she could not see a psychiatrist.  She has taken fluoxetine and some other medications in the past but did not feel they worked for too long.  Bupropion was discussed benefits risk side effects were discussed along with the black box warning of suicidal thoughts.  No history of seizures.  On assessment this morning patient denied any auditory hallucinations, visual hallucinations, did not endorse any delusions and no objective signs of psychosis evident. No signs of disorganized behavior, disorganized speech. Patient denied any racing thoughts, flight of ideas, severe sleeplessness, unusually elevated or angry mood, unusual impulsivity, unusual spending or impulsive physical relationships. No objective signs of dong or hypomania noticed.     She has never experienced any dong or hypomania in the past although she does have an aunt who suffers from bipolar disorder      Substance abuse: Patient denied any current alcohol or illicit drug use or abuse.       Medical symptoms: " Patient denied any history of seizures, fainting, dizziness, abnormal thyroid symptoms like unusual weight gain or loss, ambient temperature intolerance.       Past Psych: 2 prior history of suicide attempt,  psych hospitalization.       Past Substance: Patient denied any drug abuse, treatment  or rehab.       Family Psych: Denied any history of suicide in the family. Family history pertinent for presence of bipolar disorder and and    Social: She is working at [employer] in her hometown of [place]     MSE: Patient was alert, oriented to time, place, person and situation. Patient appears well groomed and clad in climate appropriate clothes. Patient is resigned and guarded on approach. Recent and remote memory within normal limits. Memory registration and recall within normal limits. Attention and concentration within normal limits. Speech normal in rate, rhythm and volume. Good eye contact. Thought process Linear. Intact associations. Good fund of knowledge. Mood sad and affect constricted. Patient did not endorse any delusions. Patient denied any auditory visual hallucinations. Patient has denied any suicidal  ideations and is not future oriented.  Patient has fair insight, fair judgment and good impulse control.     Musculoskeletal: Normal gait, no Parkinsonism, no Dystonia, no Akathisia, no TD. Psychomotor activity within normal limits.     Diagnosis: Major depression severe recurrent    Assessment and Plan:     Wellbutrin  mg once in the morning would be offered to her and based on tolerability and efficacy of this may be adjusted further    .     Past Medical History  History reviewed. No pertinent past medical history.    Surgical History  History reviewed. No pertinent surgical history.     Social History  She reports that she has never smoked. She has never used smokeless tobacco. No history on file for alcohol use and drug use.    Family History  No family history on file.     Allergies  Patient has no  "known allergies.     Last Recorded Vitals  Blood pressure 98/55, pulse 65, temperature 36.4 °C (97.5 °F), temperature source Temporal, resp. rate 16, height 1.753 m (5' 9\"), weight 79 kg (174 lb 2.6 oz), SpO2 100%.    Relevant Results  Recent Results (from the past 48 hours)   CBC and Auto Differential    Collection Time: 11/15/24 11:39 AM   Result Value Ref Range    WBC 3.7 (L) 4.4 - 11.3 x10*3/uL    nRBC 0.0 0.0 - 0.0 /100 WBCs    RBC 4.48 4.00 - 5.20 x10*6/uL    Hemoglobin 12.9 12.0 - 16.0 g/dL    Hematocrit 39.0 36.0 - 46.0 %    MCV 87 80 - 100 fL    MCH 28.8 26.0 - 34.0 pg    MCHC 33.1 32.0 - 36.0 g/dL    RDW 12.9 11.5 - 14.5 %    Platelets 250 150 - 450 x10*3/uL    Neutrophils % 32.2 40.0 - 80.0 %    Immature Granulocytes %, Automated 0.3 0.0 - 0.9 %    Lymphocytes % 52.6 13.0 - 44.0 %    Monocytes % 11.1 2.0 - 10.0 %    Eosinophils % 2.7 0.0 - 6.0 %    Basophils % 1.1 0.0 - 2.0 %    Neutrophils Absolute 1.20 1.20 - 7.70 x10*3/uL    Immature Granulocytes Absolute, Automated 0.01 0.00 - 0.70 x10*3/uL    Lymphocytes Absolute 1.95 1.20 - 4.80 x10*3/uL    Monocytes Absolute 0.41 0.10 - 1.00 x10*3/uL    Eosinophils Absolute 0.10 0.00 - 0.70 x10*3/uL    Basophils Absolute 0.04 0.00 - 0.10 x10*3/uL   Comprehensive Metabolic Panel    Collection Time: 11/15/24 11:39 AM   Result Value Ref Range    Glucose 76 74 - 99 mg/dL    Sodium 141 136 - 145 mmol/L    Potassium 3.9 3.5 - 5.3 mmol/L    Chloride 108 (H) 98 - 107 mmol/L    Bicarbonate 23 21 - 32 mmol/L    Anion Gap 14 10 - 20 mmol/L    Urea Nitrogen 17 6 - 23 mg/dL    Creatinine 0.82 0.50 - 1.05 mg/dL    eGFR >90 >60 mL/min/1.73m*2    Calcium 9.3 8.6 - 10.6 mg/dL    Albumin 4.3 3.4 - 5.0 g/dL    Alkaline Phosphatase 52 33 - 110 U/L    Total Protein 7.3 6.4 - 8.2 g/dL    AST 11 9 - 39 U/L    Bilirubin, Total 0.4 0.0 - 1.2 mg/dL    ALT 7 7 - 45 U/L   Acute Toxicology Panel, Blood    Collection Time: 11/15/24 11:39 AM   Result Value Ref Range    Acetaminophen <10.0 10.0 - " 30.0 ug/mL    Salicylate  <3 4 - 20 mg/dL    Alcohol <10 <=10 mg/dL   TSH with reflex to Free T4 if abnormal    Collection Time: 11/15/24 11:39 AM   Result Value Ref Range    Thyroid Stimulating Hormone 2.20 0.44 - 3.98 mIU/L   Drug Screen, Urine    Collection Time: 11/15/24  1:52 PM   Result Value Ref Range    Amphetamine Screen, Urine Presumptive Negative Presumptive Negative    Barbiturate Screen, Urine Presumptive Negative Presumptive Negative    Benzodiazepines Screen, Urine Presumptive Negative Presumptive Negative    Cannabinoid Screen, Urine Presumptive Negative Presumptive Negative    Cocaine Metabolite Screen, Urine Presumptive Negative Presumptive Negative    Fentanyl Screen, Urine Presumptive Negative Presumptive Negative    Opiate Screen, Urine Presumptive Negative Presumptive Negative    Oxycodone Screen, Urine Presumptive Negative Presumptive Negative    PCP Screen, Urine Presumptive Negative Presumptive Negative    Methadone Screen, Urine Presumptive Negative Presumptive Negative   POCT pregnancy, urine    Collection Time: 11/15/24  1:55 PM   Result Value Ref Range    Preg Test, Ur Negative Negative   Red Top    Collection Time: 11/15/24  2:01 PM   Result Value Ref Range    Extra Tube Hold for add-ons.    SST TOP    Collection Time: 11/15/24  2:01 PM   Result Value Ref Range    Extra Tube Hold for add-ons.         Current Facility-Administered Medications:     acetaminophen (Tylenol) tablet 650 mg, 650 mg, oral, q4h PRN, Stone Lewis MD    alum-mag hydroxide-simeth (Mylanta) 200-200-20 mg/5 mL oral suspension 30 mL, 30 mL, oral, q6h PRN, Stone Lewis MD    buPROPion SR (Wellbutrin SR) 12 hr tablet 100 mg, 100 mg, oral, Daily, Stone Lewis MD    diphenhydrAMINE (Sominex) tablet 50 mg, 50 mg, oral, q6h PRN **OR** diphenhydrAMINE (BENADryl) injection 50 mg, 50 mg, intramuscular, Once PRN, Stone Lewis MD    flu vaccine trivalent (PF) (Fluarix/Fluzone/Flulaval) 6 months or greater injection, 0.5 mL,  intramuscular, During hospitalization, Stone Lewis MD    hydrOXYzine HCL (Atarax) tablet 50 mg, 50 mg, oral, q6h PRN, Stone Lewis MD    ibuprofen tablet 400 mg, 400 mg, oral, q6h PRN, Stone Lewis MD    magnesium hydroxide (Milk of Magnesia) 400 mg/5 mL suspension 10 mL, 10 mL, oral, Daily PRN, Stone Lewis MD    OLANZapine (ZyPREXA) tablet 5 mg, 5 mg, oral, q6h PRN **OR** OLANZapine (ZyPREXA) injection 5 mg, 5 mg, intramuscular, q6h PRN, Stone Lewis MD    traZODone (Desyrel) tablet 50 mg, 50 mg, oral, Nightly PRN, Stone Lewis MD     Psychiatric Risk Assessment  Violence Risk Assessment: none  Acute Risk of Harm to Others is Considered: low   Suicide Risk Assessment: prior suicide attempt and recent suicide attempt  Protective Factors against Suicide: fear of suicide, hopefulness/future orientation, positive family relationships, and sense of responsibility toward family  Acute Risk of Harm to Self is Considered: moderate    Medication Consent: risks, benefits, side effects reviewed for all ordered meds    Stone Lewis MD

## 2024-11-16 NOTE — PROGRESS NOTES
Occupational Therapy     REHAB Therapy Group Treatment p.m.    Patient Name: Chaparro Gonzalez  MRN: [id number]  Today's Date: 11/16/2024  Time in 1310  Time out 1405  55 min  Activity Assessment:   Pt demonstrated active initiation /engagement in OT group positive  affirmation activity without expression of  suicidal ideation. Pt was able to self identify positive affirmation in self.    OT Interventions group/1:1 : Therapeutic use of self/activities, OT Task Skills Group, OT Life Skills Management Skills, Grief Management/Anger Management ,  ADL/I ADL  , Positive Coping Skills/Stress Management, Community Re-Entry, Relaxation, Self Esteem, Communication Skills, Time Management Skills, Addiction education/prevention        Attendance:  Attendance  Activity: Discussion/reminisce  Participation: Active participation    Therapeutic Recreation:  Treatment Approach  Approach :  (55 min)  Patient Stated Goals: none  Cognition: Attention  Social Skills: Cooperates with others in group activity  Emotional: Mood, Behaviors  Stress Management/Relaxation Training: Identifies benefits of stress management/relaxation techniques  Treatment Approach Comments: Pt demonstrated active engagement in self esteem exercises Seven steps to self esteem/positive affirmations worksheet .Pt able to provide  examples of positive affirmations as applied to self, demonstrating increased engagement and initation as OT group progressed. Pt remained pleasant throughout OT group interaction.No verbalization of SI or altered reality this date.Pt demonstrated good comprehension  throughout OT intervention.      Encounter Problems       Encounter Problems (Active)       impaired functional daily living skills       Communication/Interaction Skills (Self-expression/social Behavior/assertive) Explore/demonstrate 1-2 effective methods of expressing feelings/wants/needs (can include assertion/engaging/sharing/asking) prior to discharge (Progressing)        Start:  11/16/24    Expected End:  12/14/24            Coping Skills Explore/identify 1-2 effective strategies of expressing feelings, wants, needs(can include assertion, engaging, sharing, asking) prior to discharge (Progressing)       Start:  11/16/24    Expected End:  12/14/24             Emotion Regulation/self control Pt will exhibit appropriate range, regulation of emotions, impulse control, frustration tolerance in OT groups prior to discharge.  (Progressing)       Start:  11/16/24    Expected End:  12/14/24                     Education Documentation  Handouts, taught by Geeta Barajas OT at 11/16/2024  4:09 PM.  Learner: Patient  Readiness: Acceptance  Method: Demonstration  Response: Demonstrated Understanding, Needs Reinforcement    Precautions, taught by Geeta Barajas OT at 11/16/2024  4:09 PM.  Learner: Patient  Readiness: Acceptance  Method: Demonstration  Response: Demonstrated Understanding, Needs Reinforcement    Handouts, taught by Geeta Barajas OT at 11/16/2024  3:13 PM.  Learner: Patient  Readiness: Acceptance  Method: Demonstration  Response: Demonstrated Understanding, Needs Reinforcement    Precautions, taught by Geeta Barajas OT at 11/16/2024  3:13 PM.  Learner: Patient  Readiness: Acceptance  Method: Demonstration  Response: Demonstrated Understanding, Needs Reinforcement    Education Comments  No comments found.          Additional Comments:

## 2024-11-16 NOTE — PROGRESS NOTES
Occupational Therapy     REHAB Therapy Assessment     Patient Name: Chaparro Gonzalez  MRN: [id number]  Today's Date: 11/16/2024  Time in 11:44  Time out 12:07  23 minutes  Activity Assessment:   Pt demonstrated cooperativeness and motivation for OT assessment participation. Good self disclosure was noted  Decline in function due to decline in mental health,. recommend daily OT tx intervention. Good prognosis for goal attainment    Current/Past medical hxDx: Depression/ SA suicide attempt by drowning secondary to worsening depression over the past few months.   PMHx including PTSD, previous suicide attempts, anxiety, and depression   OT Interventions group/1:1 : Therapeutic use of self/activities, OT Task Skills Group, OT Life Skills Management Skills, Grief Management/Anger Management ,  ADL/I ADL  , Positive Coping Skills/Stress Management, Community Re-Entry, Relaxation, Self Esteem, Communication Skills, Time Management Skills, Addiction education/prevention   Attendance:    Cooperative  Therapeutic Recreation:  Precautions: elopement, SI    Meaningful Occupations:  [employer]     Sources of Support:    mother, partner Christina who lives in [place]// Pt has 3 sisters and 3 brothers    Prior Level of Function/Living Situation: indep    Activities of Daily Living/Self Care  Living Situation: lives alone pt is being threatened with eviction notices x2/ pt has been homeless in the past  Hygiene/Grooming: independence  Bathing: independence  Dressing: independence  Toileting: independence  Continence: independence  Feeding: independence  Functional Mobility: independence  Medication Use/Follows Medical Advice: compliant with meds on 5 west  ADL Comment: indep     Instrumental Activities of Daily Living    Driving/Community Mobility: impaired Pt does not have a car and does not have money for [employer]. Due to inability for pt to get to her job at [employer] , she was let go  Financial Management: impaired inability to pay  "bills  Physical Self-care : WFL  Emotional Self-care: impaired  Home Care/Chores: WFL  Cooking: WFL  Safety Judgement: impaired  Rest/Sleep: impaired  Education:   10th grade  Work/Volunteering:   unemployed  IADL/Daily Routine Comment:   wfl    Social Participation/Community Involvement:  Socializing: Mother, partner (pt visits in [place] )  Balanced Relationships:  loss of role as a worker    Emotional Regulation Skills:  Emotional Expression:  Affect: animated/appropriate/ mood saddened  Speech: regular rhythm, rate, volume, & tone  Mood/Impulse Modulation: poor depression management, poor anxiety mgnt  Coping Skills:  Helpful: relaxation skills (deep breathing, PMR, etc.), mindfulness/meditation, sensory regulation, positive self-talk, social support (including support groups), creative outlets, and exercise  Harmful: suicidal or homicidal ideation    Cognitive & Task Performance Skills:  Orientation: person, place, time, and situation  Attention Span: sustained  Problem-solving: impaired  Decision-making: impaired  Thought Content: suicidal ideation  Thought Processes: coherent  Organizational Skills: thought processes are linear and organized  Short Term Memory: [facility]  Remote Memory: [facility]  Safety Judgement: impaired  Perseveration:  not present  Insight/Judgement:  impaired    Communication and Interpersonal Skills:  Boundaries: [facility]  Appropriate Disclosure: [facility]  Assertion: Pt attempts to practice   Communication/Interpersonal Comment: Pt can benefit from additional education               *  Values:mom               * stressors: feeling up and then down- \"it gets hard to get back up and do it again:              * Goal Counseling - get meds and not having a mental breakdown, have a positive  attitude, calm down anxiety and stress less                   Encounter Problems       Encounter Problems (Active)       impaired functional daily living skills       Communication/Interaction Skills " (Self-expression/social Behavior/assertive) Explore/demonstrate 1-2 effective methods of expressing feelings/wants/needs (can include assertion/engaging/sharing/asking) prior to discharge (Progressing)       Start:  11/16/24    Expected End:  12/14/24            Coping Skills Explore/identify 1-2 effective strategies of expressing feelings, wants, needs(can include assertion, engaging, sharing, asking) prior to discharge (Progressing)       Start:  11/16/24    Expected End:  12/14/24             Emotion Regulation/self control Pt will exhibit appropriate range, regulation of emotions, impulse control, frustration tolerance in OT groups prior to discharge.  (Progressing)       Start:  11/16/24    Expected End:  12/14/24                     Education Documentation  Handouts, taught by Geeta Barajas OT at 11/16/2024  3:13 PM.  Learner: Patient  Readiness: Acceptance  Method: Demonstration  Response: Demonstrated Understanding, Needs Reinforcement    Precautions, taught by Geeta Barajas OT at 11/16/2024  3:13 PM.  Learner: Patient  Readiness: Acceptance  Method: Demonstration  Response: Demonstrated Understanding, Needs Reinforcement    Education Comments  No comments found.    Pt demonstrated good comprehension of all instruction      Additional Comments:
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
## 2024-11-17 VITALS
WEIGHT: 174.16 LBS | DIASTOLIC BLOOD PRESSURE: 65 MMHG | RESPIRATION RATE: 18 BRPM | SYSTOLIC BLOOD PRESSURE: 109 MMHG | HEART RATE: 83 BPM | BODY MASS INDEX: 25.8 KG/M2 | OXYGEN SATURATION: 100 % | HEIGHT: 69 IN | TEMPERATURE: 97 F

## 2024-11-17 PROCEDURE — 2500000001 HC RX 250 WO HCPCS SELF ADMINISTERED DRUGS (ALT 637 FOR MEDICARE OP): Performed by: PSYCHIATRY & NEUROLOGY

## 2024-11-17 PROCEDURE — 99232 SBSQ HOSP IP/OBS MODERATE 35: CPT | Performed by: PSYCHIATRY & NEUROLOGY

## 2024-11-17 PROCEDURE — 97150 GROUP THERAPEUTIC PROCEDURES: CPT | Mod: GO

## 2024-11-17 PROCEDURE — 1240000001 HC SEMI-PRIVATE BH ROOM DAILY

## 2024-11-17 RX ORDER — BUPROPION HYDROCHLORIDE 150 MG/1
150 TABLET, EXTENDED RELEASE ORAL DAILY
Status: DISCONTINUED | OUTPATIENT
Start: 2024-11-17 | End: 2024-11-19

## 2024-11-17 RX ADMIN — BUPROPION HYDROCHLORIDE 150 MG: 150 TABLET, EXTENDED RELEASE ORAL at 08:19

## 2024-11-17 ASSESSMENT — PAIN - FUNCTIONAL ASSESSMENT: PAIN_FUNCTIONAL_ASSESSMENT: 0-10

## 2024-11-17 ASSESSMENT — COLUMBIA-SUICIDE SEVERITY RATING SCALE - C-SSRS
6. HAVE YOU EVER DONE ANYTHING, STARTED TO DO ANYTHING, OR PREPARED TO DO ANYTHING TO END YOUR LIFE?: NO
2. HAVE YOU ACTUALLY HAD ANY THOUGHTS OF KILLING YOURSELF?: NO
1. SINCE LAST CONTACT, HAVE YOU WISHED YOU WERE DEAD OR WISHED YOU COULD GO TO SLEEP AND NOT WAKE UP?: NO

## 2024-11-17 ASSESSMENT — PAIN SCALES - GENERAL: PAINLEVEL_OUTOF10: 0 - NO PAIN

## 2024-11-17 NOTE — CARE PLAN
Problem: Potential for Harm to Self or Others  Goal: Participates in unit activities  Outcome: Progressing  Goal: Patient/Family participate in treatment and discharge plans  Outcome: Progressing  Goal: Identifies deescalation techniques  Outcome: Progressing  Goal: Understands least restrictive measures  Outcome: Progressing  Goal: Identifies stressors that lead to harmful behaviors  Outcome: Progressing  Goal: Notifies staff when experiencing harmful thoughts toward self/others  Outcome: Progressing  Goal: Denies harm toward self or others  Outcome: Progressing  Goal: Free from restraint events  Outcome: Progressing   The patient's goals for the shift include sleep    The clinical goals for the shift include sleep    Over the shift, the patient did not make progress toward the following goals. Barriers to progression include clinical presentation. Recommendations to address these barriers include encourage medication compliance.    Pt out and visible on unit this evening. Pt endorses depression and anxiety, denies self harm thoughts. Pt ate snack and talked to fiance on phone. Pt requested flu vaccine be given in a.m. dose rescheduled to 0900. Pt rested in bed all night.

## 2024-11-17 NOTE — PROGRESS NOTES
Chaparro Gonzalez is a 24 y.o. female on day 1 of admission presenting with depression and suicidal ideations tolerated Wellbutrin  mg fairly well yesterday.  No dizziness no headache.  Dose will be adjusted further.  Patient is tolerating the marin milieu well.   Patient is attending the groups and meetings and finds them useful in developing more understanding of their symptoms and developing coping skills. Patient is tolerating medications well.   Labs Reviewed.  Vitals Reviewed.   Nursing Notes Reviewed.   No EPS, TD, vitals stable.      MSE: Patient was alert, oriented to time, place, person and situation. Patient appears well groomed and clad in climate appropriate clothes. Patient is cooperative on approach. Recent and remote memory within normal limits. Memory registration and recall within normal limits. Attention and concentration within normal limits. Speech normal in rate, rhythm and volume. Good eye contact. Thought process Linear. Intact associations. Good fund of knowledge. Mood sad and affect constricted. Patient did not endorse any delusions. Patient denied any auditory visual hallucinations. Patient has denied any suicidal  ideations and is not future oriented.  Patient has fair insight, fair judgment and good impulse control.     Musculoskeletal: Normal gait, no Parkinsonism, no Dystonia, no Akathisia, no TD. Psychomotor activity within normal limits.     Diagnosis: Major depression    Assessment and Plan:     Increase Wellbutrin  mg once a day every morning  Need for Inpatient Care: Medication titration, monitoring for side effects mood and impulsivity.  Plan for inpatient psychiatric care: Continue with psychiatric care in [facility].  Continue with current treatment and medication adjustments. Patient is agreeable with continued medication management and adjustments discussed.         Last Recorded Vitals  BP (!) 88/49   Pulse 63   Temp 36.7 °C (98.1 °F)   Resp 18   Ht 1.753 m (5'  "9\")   Wt 79 kg (174 lb 2.6 oz)   SpO2 99%   BMI 25.72 kg/m²      No results found for this or any previous visit (from the past 24 hours).       Current Facility-Administered Medications:     acetaminophen (Tylenol) tablet 650 mg, 650 mg, oral, q4h PRN, Stone Lewis MD    alum-mag hydroxide-simeth (Mylanta) 200-200-20 mg/5 mL oral suspension 30 mL, 30 mL, oral, q6h PRN, Stone Lewis MD    buPROPion SR (Wellbutrin SR) 12 hr tablet 150 mg, 150 mg, oral, Daily, Stone Lewis MD    diphenhydrAMINE (Sominex) tablet 50 mg, 50 mg, oral, q6h PRN **OR** diphenhydrAMINE (BENADryl) injection 50 mg, 50 mg, intramuscular, Once PRN, Stone Lewis MD    flu vaccine trivalent (PF) (Fluarix/Fluzone/Flulaval) 6 months or greater injection, 0.5 mL, intramuscular, During hospitalization, Stone Lewis MD    hydrOXYzine HCL (Atarax) tablet 50 mg, 50 mg, oral, q6h PRN, Stone Lewis MD    ibuprofen tablet 400 mg, 400 mg, oral, q6h PRN, Stone Lewis MD    magnesium hydroxide (Milk of Magnesia) 400 mg/5 mL suspension 10 mL, 10 mL, oral, Daily PRN, Stone Lewis MD    OLANZapine (ZyPREXA) tablet 5 mg, 5 mg, oral, q6h PRN **OR** OLANZapine (ZyPREXA) injection 5 mg, 5 mg, intramuscular, q6h PRN, Stone Lewis MD    traZODone (Desyrel) tablet 50 mg, 50 mg, oral, Nightly PRN, MD Stone Michelle MD    " Hemostasis: None

## 2024-11-17 NOTE — PROGRESS NOTES
"Occupational Therapy     REHAB Therapy Assessment & Treatment    Patient Name: Chaparro Gonzalez  MRN: [id number]  Today's Date: 11/17/2024      Activity:  Self-Esteem Group     Attendance:  Attendance  Activity: Discussion/reminisce  Participation: Active participation    Treatment Approach  Approach : Group therapy sessions  Patient Stated Goals: none stated  Cognition: Attention, Directions (Pt alert, oriented, & attentive. Follows complex directions independently.)  Social Skills: Demonstrates ability to listen to others, Cooperates with others in group activity  Emotional: Mood (Pt mood calm, affect animated & appropriate)  Stress Management/Relaxation Training: Identifies benefits of stress management/relaxation techniques  Treatment Approach Comments: Pt attended & participated in afternoon therapy group focused on self-esteem. Began by filling out “Self-Esteem Checklist” self-assessment, which is used to help participants further understand the components of self-esteem they should prioritize. Pt educated on concept of self-esteem. Pt participated in discussion about ways that positive vs. negative self-esteem can impact participation in meaningful roles & occupations. Then, pt educated on tips for improving self-esteem, including spending time w/ oneself, positive self-talk, emulating role models, & gratitude. Next, pt participated in practice of 2 strategies aimed at increasing self-esteem. During “Core Beliefs” exercise, pt able to identify one of their negative core beliefs as, \"I'm stupid\" & to challenge the core belief using fact-checking, including, \"I'm a curious person\" & \"Nobody is smart about everything\". During “Strengths Use Plan” exercise, pt able to identify personal strengths, including curiousity & adventurousness. Then, pt developed a plan of activities that utilize these strengths, including learning about new things & exploring information on interests. Pt demonstrates good " understanding.      Encounter Problems       Encounter Problems (Active)       impaired functional daily living skills       Communication/Interaction Skills (Self-expression/social Behavior/assertive) Explore/demonstrate 1-2 effective methods of expressing feelings/wants/needs (can include assertion/engaging/sharing/asking) prior to discharge (Progressing)       Start:  11/16/24    Expected End:  12/14/24            Coping Skills Explore/identify 1-2 effective strategies of expressing feelings, wants, needs(can include assertion, engaging, sharing, asking) prior to discharge (Progressing)       Start:  11/16/24    Expected End:  12/14/24             Emotion Regulation/self control Pt will exhibit appropriate range, regulation of emotions, impulse control, frustration tolerance in OT groups prior to discharge.  (Progressing)       Start:  11/16/24    Expected End:  12/14/24                 Education:  Pt given educational handouts on self-esteem, core beliefs, & strengths. Reviewed, discussed, & practiced. Pt demonstrates good understanding

## 2024-11-17 NOTE — CARE PLAN
The patient's goals for the shift include Patient reports goal is to keep a positive mindset.    The clinical goals for the shift include Patient will actively participate in treatment plan.    Patient denies SI, HI, A/VH. Patients mood is anxious and depressed, affect appropriate to situation. Patient is isolative to self and room but is also observed to be on the therapeutic milieu. Patient observed to be in attendance to group therapy. Patient was out to dayroom for all meals with good appetite. Patient compliant with scheduled medication. Patient able to make needs known. Care ongoing.       Problem: Potential for Harm to Self or Others  Goal: Participates in unit activities  Outcome: Progressing  Goal: Patient/Family participate in treatment and discharge plans  Outcome: Progressing  Goal: Identifies deescalation techniques  Outcome: Progressing  Goal: Understands least restrictive measures  Outcome: Progressing  Goal: Identifies stressors that lead to harmful behaviors  Outcome: Progressing  Goal: Notifies staff when experiencing harmful thoughts toward self/others  Outcome: Progressing  Goal: Denies harm toward self or others  Outcome: Progressing  Goal: Free from restraint events  Outcome: Progressing     Problem: Educational/Scholastic Disruption  Goal: Meets educational requirements during hospitalization  Outcome: Progressing  Goal: Attends class without disruptive behavior  Outcome: Progressing  Goal: Completes daily assignments  Outcome: Progressing     Problem: Ineffective Coping  Goal: Identifies ineffective coping skills  Outcome: Progressing  Goal: Identifies healthy coping skills  Outcome: Progressing  Goal: Demonstrates healthy coping skills  Outcome: Progressing  Goal: Participates in unit activities  Outcome: Progressing     Problem: Alteration in Sleep  Goal: STG - Reports nightly sleep, duration, and quality  Outcome: Progressing  Goal: STG - Identifies sleep hygiene aids  Outcome:  Progressing  Goal: STG - Informs staff if unable to sleep  Outcome: Progressing     Problem: Anxiety  Goal: Attempts to manage anxiety with help  Outcome: Progressing  Goal: Verbalizes ways to manage anxiety  Outcome: Progressing  Goal: Implements measures to reduce anxiety  Outcome: Progressing     Problem: Self Care Deficit  Goal: STG - Patient completes hygiene  Outcome: Progressing  Goal: Increase group attendance  Outcome: Progressing  Goal: Accepts need for medications  Outcome: Progressing  Goal: STG - Goes to and eats meals independently in dining room 100% of time  Outcome: Progressing     Problem: Defensive Coping  Goal: Discusses and identifies healthy coping skills  Outcome: Progressing  Goal: Demonstrates healthy coping skills  Outcome: Progressing  Goal: Identifies appropriate social interaction  Outcome: Progressing  Goal: Demonstrates appropriate social interactions  Outcome: Progressing     Problem: Risk for Suicide  Goal: Accepts medications as prescribed/needed this shift  Outcome: Progressing  Goal: Identifies supports this shift  Outcome: Progressing  Goal: Makes needs known through verbalization or behaviors this shift  Outcome: Progressing  Goal: No self harm this shift  Outcome: Progressing  Goal: Read Safety Guidelines this shift  Outcome: Progressing  Goal: Complete Mental Health Safety Plan (psychiatry only) this shift  Outcome: Progressing

## 2024-11-18 PROCEDURE — 97150 GROUP THERAPEUTIC PROCEDURES: CPT | Mod: GO

## 2024-11-18 PROCEDURE — 99232 SBSQ HOSP IP/OBS MODERATE 35: CPT | Performed by: PSYCHIATRY & NEUROLOGY

## 2024-11-18 PROCEDURE — 1240000001 HC SEMI-PRIVATE BH ROOM DAILY

## 2024-11-18 PROCEDURE — 2500000001 HC RX 250 WO HCPCS SELF ADMINISTERED DRUGS (ALT 637 FOR MEDICARE OP): Performed by: PSYCHIATRY & NEUROLOGY

## 2024-11-18 RX ADMIN — TRAZODONE HYDROCHLORIDE 50 MG: 50 TABLET ORAL at 00:28

## 2024-11-18 RX ADMIN — BUPROPION HYDROCHLORIDE 150 MG: 150 TABLET, EXTENDED RELEASE ORAL at 09:11

## 2024-11-18 RX ADMIN — TRAZODONE HYDROCHLORIDE 50 MG: 50 TABLET ORAL at 22:50

## 2024-11-18 ASSESSMENT — COLUMBIA-SUICIDE SEVERITY RATING SCALE - C-SSRS
6. HAVE YOU EVER DONE ANYTHING, STARTED TO DO ANYTHING, OR PREPARED TO DO ANYTHING TO END YOUR LIFE?: NO
2. HAVE YOU ACTUALLY HAD ANY THOUGHTS OF KILLING YOURSELF?: NO
6. HAVE YOU EVER DONE ANYTHING, STARTED TO DO ANYTHING, OR PREPARED TO DO ANYTHING TO END YOUR LIFE?: NO
2. HAVE YOU ACTUALLY HAD ANY THOUGHTS OF KILLING YOURSELF?: NO
2. HAVE YOU ACTUALLY HAD ANY THOUGHTS OF KILLING YOURSELF?: NO
6. HAVE YOU EVER DONE ANYTHING, STARTED TO DO ANYTHING, OR PREPARED TO DO ANYTHING TO END YOUR LIFE?: NO
1. SINCE LAST CONTACT, HAVE YOU WISHED YOU WERE DEAD OR WISHED YOU COULD GO TO SLEEP AND NOT WAKE UP?: NO

## 2024-11-18 ASSESSMENT — PAIN SCALES - GENERAL
PAINLEVEL_OUTOF10: 0 - NO PAIN

## 2024-11-18 ASSESSMENT — PAIN - FUNCTIONAL ASSESSMENT
PAIN_FUNCTIONAL_ASSESSMENT: 0-10

## 2024-11-18 NOTE — PROGRESS NOTES
"Chaparro Gonzalez is a 24 y.o. female on day 2 of admission presenting with MDD.    Subjective   Patient reports improved mood.  Patient tolerating Wellbutrin well with no adverse effects, advised patient we will increase dose to 150 mg oral daily today.  Patient agreement with plan.  Patient has been active on unit, participating in group therapy.  States she is thinking about moving to the [facility] where her partner lives, she does not have a lot of social supports in the [place] area.    Objective     MSE  General: Appropriately groomed and dressed.  Appearance: Appears stated age.  Attitude: Calm, cooperative.  Behavior: Appropriate eye contact.  Motor activity: No agitation or retardation. no EPS.  Normal gait.  Speech: Regular rate, rhythm, volume and tone.  Mood: \"Good \"  Affect: Appropriate range  Thought process: Organized, linear, goal-directed.  Associations are logical.  Thought content: Does not endorse suicidal or homicidal ideation, no delusions elicited.  Thought perception: Did not endorse auditory or visual hallucinations.  Cognition: Alert, oriented x3.  no deficit in memory or attention.  Insight: Fair.  Judgment: Fair.    Last Recorded Vitals  BP (!) 84/45   Pulse 73   Temp 36.5 °C (97.7 °F)   Resp 16   Ht 1.753 m (5' 9\")   Wt 79 kg (174 lb 2.6 oz)   SpO2 100%   BMI 25.72 kg/m²      Relevant Results  Scheduled medications  buPROPion SR, 150 mg, oral, Daily  influenza, 0.5 mL, intramuscular, During hospitalization      Continuous medications     PRN medications  PRN medications: acetaminophen, alum-mag hydroxide-simeth, diphenhydrAMINE **OR** diphenhydrAMINE, hydrOXYzine HCL, ibuprofen, magnesium hydroxide, OLANZapine **OR** OLANZapine, traZODone    No results found for this or any previous visit (from the past 24 hours).     Assessment/Plan   Diagnosis:  MDD, moderate    Impression:     Labs and Chart: reviewed  Case discussed with treatment team members  Encouraged patient to " attend group and other [facility] activity  Collateral from family - pending  Discharge planing  Medication:       -Wellbutrin  mg oral daily    Medication Consent  Medication Consent: risks, benefits, side effects reviewed for all ordered meds and patient expressed understanding and consent obtained    LIZZIE Sargent-CNP

## 2024-11-18 NOTE — CARE PLAN
Problem: Potential for Harm to Self or Others  Goal: Participates in unit activities  Outcome: Progressing  Goal: Patient/Family participate in treatment and discharge plans  Outcome: Progressing  Goal: Identifies deescalation techniques  Outcome: Progressing  Goal: Understands least restrictive measures  Outcome: Progressing  Goal: Identifies stressors that lead to harmful behaviors  Outcome: Progressing  Goal: Notifies staff when experiencing harmful thoughts toward self/others  Outcome: Progressing  Goal: Denies harm toward self or others  Outcome: Progressing  Goal: Free from restraint events  Outcome: Progressing   The patient's goals for the shift include sleep    The clinical goals for the shift include sleep    Over the shift, the patient did not make progress toward the following goals. Barriers to progression include clinical presentation. Recommendations to address these barriers include encourage medication compliance.    Pt out and visible on unit this evening. Pt reports decrease in depression and anxiety since admission. Pt denies self harm thoughts. Pt given PRN Trazodone and ear plugs for c/o insomnia. Pt rested in bed all night.

## 2024-11-18 NOTE — PROGRESS NOTES
"Occupational Therapy     REHAB Therapy Assessment & Treatment    Patient Name: Chaparro Gonzalez  MRN: [id number]  Today's Date: 11/18/2024      Activity:  Mindfulness Group     Attendance:  Attendance  Activity: Discussion/reminisce, Relaxation therapy  Participation: Active participation    Treatment Approach  Approach : Group therapy sessions  Patient Stated Goals: none stated  Cognition: Attention, Directions (Pt alert, oriented, & attentive. Follows simple directions independently.)  Social Skills: Demonstrates ability to listen to others, Interacts with others with cues  Emotional: Mood (Pt mood calm, affect animated & appropriate)  Stress Management/Relaxation Training: Identifies benefits of stress management/relaxation techniques  Treatment Approach Comments: Pt attended & participated in morning therapy group focused on mindfulness. Pt educated on mindfulness, including definition, benefits of regular mindfulness practice (including reduced anxiety & depression, improved emotion regulation, improved memory & focus), & ways to practice (including meditation, body scans, mindfulness walks, & mindful activities). Pt participated in guided practice of 3 different 5-minute meditations, including standard breathing meditation, body scan, & “Leaves on a Stream” which utilizes visual imagery. Pt participated in discussion of each practice. Pt shared that she really enjoyed the nature-based imagery of \"Leaves on a Stream\", but it made her wish she was actually outside hearing nature sounds. Pt shared that after she leaves here, she would like to try mindfulness practice outside. Pt demonstrates good understanding & motivation to apply skills.      Encounter Problems       Encounter Problems (Active)       impaired functional daily living skills       Communication/Interaction Skills (Self-expression/social Behavior/assertive) Explore/demonstrate 1-2 effective methods of expressing feelings/wants/needs (can include " assertion/engaging/sharing/asking) prior to discharge (Progressing)       Start:  11/16/24    Expected End:  12/14/24            Coping Skills Explore/identify 1-2 effective strategies of expressing feelings, wants, needs(can include assertion, engaging, sharing, asking) prior to discharge (Progressing)       Start:  11/16/24    Expected End:  12/14/24             Emotion Regulation/self control Pt will exhibit appropriate range, regulation of emotions, impulse control, frustration tolerance in OT groups prior to discharge.  (Progressing)       Start:  11/16/24    Expected End:  12/14/24                 Education:  Pt given educational handouts on mindfulness & mindfulness practices. Reviewed, discussed, & practiced. Pt demonstrates good understanding.

## 2024-11-19 PROCEDURE — 2500000001 HC RX 250 WO HCPCS SELF ADMINISTERED DRUGS (ALT 637 FOR MEDICARE OP): Performed by: PSYCHIATRY & NEUROLOGY

## 2024-11-19 PROCEDURE — 1240000001 HC SEMI-PRIVATE BH ROOM DAILY

## 2024-11-19 PROCEDURE — 99232 SBSQ HOSP IP/OBS MODERATE 35: CPT | Performed by: PSYCHIATRY & NEUROLOGY

## 2024-11-19 PROCEDURE — 97150 GROUP THERAPEUTIC PROCEDURES: CPT | Mod: GO

## 2024-11-19 RX ORDER — BUPROPION HYDROCHLORIDE 100 MG/1
200 TABLET, EXTENDED RELEASE ORAL DAILY
Status: DISCONTINUED | OUTPATIENT
Start: 2024-11-20 | End: 2024-11-20 | Stop reason: HOSPADM

## 2024-11-19 RX ADMIN — BUPROPION HYDROCHLORIDE 150 MG: 150 TABLET, EXTENDED RELEASE ORAL at 08:09

## 2024-11-19 ASSESSMENT — COLUMBIA-SUICIDE SEVERITY RATING SCALE - C-SSRS
2. HAVE YOU ACTUALLY HAD ANY THOUGHTS OF KILLING YOURSELF?: NO
2. HAVE YOU ACTUALLY HAD ANY THOUGHTS OF KILLING YOURSELF?: NO
6. HAVE YOU EVER DONE ANYTHING, STARTED TO DO ANYTHING, OR PREPARED TO DO ANYTHING TO END YOUR LIFE?: NO
6. HAVE YOU EVER DONE ANYTHING, STARTED TO DO ANYTHING, OR PREPARED TO DO ANYTHING TO END YOUR LIFE?: NO
1. SINCE LAST CONTACT, HAVE YOU WISHED YOU WERE DEAD OR WISHED YOU COULD GO TO SLEEP AND NOT WAKE UP?: NO
1. SINCE LAST CONTACT, HAVE YOU WISHED YOU WERE DEAD OR WISHED YOU COULD GO TO SLEEP AND NOT WAKE UP?: NO

## 2024-11-19 ASSESSMENT — PAIN - FUNCTIONAL ASSESSMENT
PAIN_FUNCTIONAL_ASSESSMENT: 0-10
PAIN_FUNCTIONAL_ASSESSMENT: 0-10

## 2024-11-19 ASSESSMENT — PAIN SCALES - GENERAL
PAINLEVEL_OUTOF10: 0 - NO PAIN
PAINLEVEL_OUTOF10: 0 - NO PAIN

## 2024-11-19 NOTE — PROGRESS NOTES
Occupational Therapy     REHAB Therapy Group Treatment a.m.    Patient Name: Chaparro Gonzalez  MRN: [id number]  Today's Date: 11/19/2024  Time in 10;35  Timeout 11;40  65 min    Activity Assessment:   Pt demonstrated good comprehension of OT group without verbalization of depression/SI. Pt demonstrated good self disclosure and was information during OT group interaction. Good eye contact noted throughout OT group interaction    OT Interventions group/1:1 : Therapeutic use of self/activities, OT Task Skills Group, OT Life Skills Management Skills, Grief Management/Anger Management ,  ADL/I ADL  , Positive Coping Skills/Stress Management, Community Re-Entry, Relaxation, Self Esteem, Communication Skills, Time Management Skills, Addiction education/prevention        Attendance:  Attendance  Activity: Discussion/reminisce  Participation: Active participation    Therapeutic Recreation:  Treatment Approach  Approach : Group therapy sessions (65 min)  Patient Stated Goals: none stated  Cognition: Attention, Directions  Social Skills: Cooperates with others in group activity  Emotional: Mood  Stress Management/Relaxation Training: Identifies benefits of stress management/relaxation techniques  Treatment Approach Comments: Pt demonstrated good group focus/comprehension of sleep management group including diaphragmatic breathing education with patient demonstrating good comprehension of intrisic/extrinisc factors influencing personal sleep patterns.Pt educated in anxiety management with handout provided Stop Overthinking. Pt demonstrated good self disclosure, initation during nOT group interaction without verbalization of depression/suicidal ideation . Pt demonstrated good facial affect/eye contact throughout group discussion.      Encounter Problems       Encounter Problems (Active)       impaired functional daily living skills       Communication/Interaction Skills (Self-expression/social Behavior/assertive)  Explore/demonstrate 1-2 effective methods of expressing feelings/wants/needs (can include assertion/engaging/sharing/asking) prior to discharge (Progressing)       Start:  11/16/24    Expected End:  12/14/24            Coping Skills Explore/identify 1-2 effective strategies of expressing feelings, wants, needs(can include assertion, engaging, sharing, asking) prior to discharge (Progressing)       Start:  11/16/24    Expected End:  12/14/24             Emotion Regulation/self control Pt will exhibit appropriate range, regulation of emotions, impulse control, frustration tolerance in OT groups prior to discharge.  (Progressing)       Start:  11/16/24    Expected End:  12/14/24                     Education Documentation  Handouts, taught by Geeta Barajas, OT at 11/19/2024  3:39 PM.  Learner: Patient  Readiness: Acceptance  Method: Demonstration  Response: Demonstrated Understanding, Needs Reinforcement    Precautions, taught by Geeta Barajas, OT at 11/19/2024  3:39 PM.  Learner: Patient  Readiness: Acceptance  Method: Demonstration  Response: Demonstrated Understanding, Needs Reinforcement    Education Comments  No comments found.          Additional Comments:

## 2024-11-19 NOTE — PROGRESS NOTES
"Chaparro Gonzalez is a 24 y.o. female on day 3 of admission presenting with MDD.    Subjective   Patient mood improving.  States she has been speaking with family and friends, who have been supportive of her throughout her stay in the hospital.  Patient thinking about going back to [place] around New Year's, that is where most of her family is.  Does want to find employment in the area before then.  Patient tolerating medication with no adverse effects.    Objective     MSE  General: Appropriately groomed and dressed.  Appearance: Appears stated age.  Attitude: Calm, cooperative.  Behavior: Appropriate eye contact.  Motor activity: No agitation or retardation. no EPS.  Normal gait.  Speech: Regular rate, rhythm, volume and tone.  Mood: Less depressed  Affect: Flat  Thought process: Organized, linear, goal-directed.  Associations are logical.  Thought content: Does not endorse suicidal or homicidal ideation, no delusions elicited.  Thought perception: Did not endorse auditory or visual hallucinations.  Cognition: Alert, oriented x3.  no deficit in memory or attention.  Insight: Fair.  Judgment: Fair.    Last Recorded Vitals  BP 89/52 (BP Location: Right arm)   Pulse 64   Temp 36.9 °C (98.4 °F)   Resp 18   Ht 1.753 m (5' 9\")   Wt 79 kg (174 lb 2.6 oz)   SpO2 100%   BMI 25.72 kg/m²      Relevant Results  Scheduled medications  [START ON 11/20/2024] buPROPion SR, 200 mg, oral, Daily  influenza, 0.5 mL, intramuscular, During hospitalization      Continuous medications     PRN medications  PRN medications: acetaminophen, alum-mag hydroxide-simeth, diphenhydrAMINE **OR** diphenhydrAMINE, hydrOXYzine HCL, ibuprofen, magnesium hydroxide, OLANZapine **OR** OLANZapine, traZODone    No results found for this or any previous visit (from the past 24 hours).     Assessment/Plan   Diagnosis:  MDD, moderate    Impression:     Labs and Chart: reviewed  Case discussed with treatment team members  Encouraged patient to attend " [facility]  Collateral from family - pending  Discharge planing  Medication:       - Reviewed. To continue as ordered    Medication Consent  Medication Consent: risks, benefits, side effects reviewed for all ordered meds and patient expressed understanding and consent obtained    LIZZIE Sargent-CNP

## 2024-11-19 NOTE — PROGRESS NOTES
Occupational Therapy     REHAB Therapy Group Treatment p.m.    Patient Name: Chaparro Gonzalez  MRN: [id number]  Today's Date: 11/19/2024  Time in 13:05  Time out: 13:43  38 min  Activity Assessment:     Pt demonstrated good group focus and engagement without suicidal/depressive comments. Brightened affect noted. Good motivation for OT group attendance  OT Interventions group/1:1 : Therapeutic use of self/activities, OT Task Skills Group, OT Life Skills Management Skills, Grief Management/Anger Management ,  ADL/I ADL  , Positive Coping Skills/Stress Management, Community Re-Entry, Relaxation, Self Esteem, Communication Skills, Time Management Skills, Addiction education/prevention        Attendance:  Attendance  Activity: Discussion/reminisce  Participation: Active participation    Therapeutic Recreation:  Treatment Approach  Approach : 30 to 45 minutes  Patient Stated Goals: none stated  Cognition: Attention, Directions  Social Skills: Cooperates with others in group activity  Emotional: Mood  Stress Management/Relaxation Training: Identifies benefits of stress management/relaxation techniques  Treatment Approach Comments: Pt demonstrated good group focus/comprehension of distortions in thinking/perceptions/perspectives influencing mood/behavior.Good initiation during education in gratitude as mood enhancer with handout provided.Pt demonstrated good initiation throughout OT group intervention with brightened affect noted.      Encounter Problems       Encounter Problems (Active)       impaired functional daily living skills       Communication/Interaction Skills (Self-expression/social Behavior/assertive) Explore/demonstrate 1-2 effective methods of expressing feelings/wants/needs (can include assertion/engaging/sharing/asking) prior to discharge (Progressing)       Start:  11/16/24    Expected End:  12/14/24            Coping Skills Explore/identify 1-2 effective strategies of expressing feelings, wants,  needs(can include assertion, engaging, sharing, asking) prior to discharge (Progressing)       Start:  11/16/24    Expected End:  12/14/24             Emotion Regulation/self control Pt will exhibit appropriate range, regulation of emotions, impulse control, frustration tolerance in OT groups prior to discharge.  (Progressing)       Start:  11/16/24    Expected End:  12/14/24                     Education Documentation  Handouts, taught by Geeta Barajas OT at 11/19/2024  6:40 PM.  Learner: Patient  Readiness: Acceptance  Method: Demonstration  Response: Demonstrated Understanding, Needs Reinforcement    Precautions, taught by Geeta Barajas OT at 11/19/2024  6:40 PM.  Learner: Patient  Readiness: Acceptance  Method: Demonstration  Response: Demonstrated Understanding, Needs Reinforcement    Handouts, taught by Geeta Barajas OT at 11/19/2024  3:39 PM.  Learner: Patient  Readiness: Acceptance  Method: Demonstration  Response: Demonstrated Understanding, Needs Reinforcement    Precautions, taught by Geeta Barajas OT at 11/19/2024  3:39 PM.  Learner: Patient  Readiness: Acceptance  Method: Demonstration  Response: Demonstrated Understanding, Needs Reinforcement    Education Comments  No comments found.          Additional Comments:

## 2024-11-19 NOTE — CARE PLAN
Problem: Potential for Harm to Self or Others  Goal: Participates in unit activities  Outcome: Progressing  Goal: Patient/Family participate in treatment and discharge plans  Outcome: Progressing  Goal: Identifies deescalation techniques  Outcome: Progressing  Goal: Understands least restrictive measures  Outcome: Progressing  Goal: Identifies stressors that lead to harmful behaviors  Outcome: Progressing  Goal: Notifies staff when experiencing harmful thoughts toward self/others  Outcome: Progressing  Goal: Denies harm toward self or others  Outcome: Progressing  Goal: Free from restraint events  Outcome: Progressing   The patient's goals for the shift include sleep    The clinical goals for the shift include sleep    Over the shift, the patient did not make progress toward the following goals. Barriers to progression include clinical presentation. Recommendations to address these barriers include encourage medication compliance.    Pt out and visible on unit. Pt took shower and ate snack. Pt reports marked improvement with depression and denies suicidal ideations. Pt given PRN Trazodone for c/o insomnia. Pt rested in bed all night.

## 2024-11-19 NOTE — CARE PLAN
The patient's goals for the shift include Patient reports her goal is to go to all group therapy sessions.    The clinical goals for the shift include Patient will actively participate in treatment plan.    Patient denies SI, HI, A/VH. Patients mood is anxious and depressed, affect appropriate to situation. Patient is observed to be out on unit. Patient was out to dayroom for all meals with good appetite. Patient compliant with scheduled medication. Patient able to make needs known. Care ongoing.       Problem: Potential for Harm to Self or Others  Goal: Participates in unit activities  Outcome: Progressing  Goal: Patient/Family participate in treatment and discharge plans  Outcome: Progressing  Goal: Identifies deescalation techniques  Outcome: Progressing  Goal: Understands least restrictive measures  Outcome: Progressing  Goal: Identifies stressors that lead to harmful behaviors  Outcome: Progressing  Goal: Notifies staff when experiencing harmful thoughts toward self/others  Outcome: Progressing  Goal: Denies harm toward self or others  Outcome: Progressing  Goal: Free from restraint events  Outcome: Progressing     Problem: Educational/Scholastic Disruption  Goal: Meets educational requirements during hospitalization  Outcome: Progressing  Goal: Attends class without disruptive behavior  Outcome: Progressing  Goal: Completes daily assignments  Outcome: Progressing     Problem: Ineffective Coping  Goal: Identifies ineffective coping skills  Outcome: Progressing  Goal: Identifies healthy coping skills  Outcome: Progressing  Goal: Demonstrates healthy coping skills  Outcome: Progressing  Goal: Participates in unit activities  Outcome: Progressing     Problem: Alteration in Sleep  Goal: STG - Reports nightly sleep, duration, and quality  Outcome: Progressing  Goal: STG - Identifies sleep hygiene aids  Outcome: Progressing  Goal: STG - Informs staff if unable to sleep  Outcome: Progressing     Problem:  Anxiety  Goal: Attempts to manage anxiety with help  Outcome: Progressing  Goal: Verbalizes ways to manage anxiety  Outcome: Progressing  Goal: Implements measures to reduce anxiety  Outcome: Progressing     Problem: Self Care Deficit  Goal: STG - Patient completes hygiene  Outcome: Progressing  Goal: Increase group attendance  Outcome: Progressing  Goal: Accepts need for medications  Outcome: Progressing  Goal: STG - Goes to and eats meals independently in dining room 100% of time  Outcome: Progressing     Problem: Defensive Coping  Goal: Discusses and identifies healthy coping skills  Outcome: Progressing  Goal: Demonstrates healthy coping skills  Outcome: Progressing  Goal: Identifies appropriate social interaction  Outcome: Progressing  Goal: Demonstrates appropriate social interactions  Outcome: Progressing     Problem: Risk for Suicide  Goal: Accepts medications as prescribed/needed this shift  Outcome: Progressing  Goal: Identifies supports this shift  Outcome: Progressing  Goal: Makes needs known through verbalization or behaviors this shift  Outcome: Progressing  Goal: No self harm this shift  Outcome: Progressing  Goal: Read Safety Guidelines this shift  Outcome: Progressing  Goal: Complete Mental Health Safety Plan (psychiatry only) this shift  Outcome: Progressing        Improved

## 2024-11-20 VITALS
RESPIRATION RATE: 18 BRPM | OXYGEN SATURATION: 98 % | SYSTOLIC BLOOD PRESSURE: 100 MMHG | HEART RATE: 80 BPM | HEIGHT: 69 IN | DIASTOLIC BLOOD PRESSURE: 58 MMHG | WEIGHT: 174.16 LBS | BODY MASS INDEX: 25.8 KG/M2 | TEMPERATURE: 96.6 F

## 2024-11-20 PROCEDURE — 99239 HOSP IP/OBS DSCHRG MGMT >30: CPT | Performed by: PSYCHIATRY & NEUROLOGY

## 2024-11-20 PROCEDURE — 2500000001 HC RX 250 WO HCPCS SELF ADMINISTERED DRUGS (ALT 637 FOR MEDICARE OP): Performed by: PSYCHIATRY & NEUROLOGY

## 2024-11-20 PROCEDURE — 97150 GROUP THERAPEUTIC PROCEDURES: CPT | Mod: GO

## 2024-11-20 RX ORDER — BUPROPION HYDROCHLORIDE 100 MG/1
200 TABLET, EXTENDED RELEASE ORAL DAILY
Qty: 60 TABLET | Refills: 0 | Status: SHIPPED | OUTPATIENT
Start: 2024-11-21

## 2024-11-20 RX ADMIN — BUPROPION HYDROCHLORIDE 200 MG: 100 TABLET, EXTENDED RELEASE ORAL at 08:26

## 2024-11-20 RX ADMIN — TRAZODONE HYDROCHLORIDE 50 MG: 50 TABLET ORAL at 01:33

## 2024-11-20 ASSESSMENT — PAIN - FUNCTIONAL ASSESSMENT: PAIN_FUNCTIONAL_ASSESSMENT: 0-10

## 2024-11-20 ASSESSMENT — PAIN SCALES - GENERAL: PAINLEVEL_OUTOF10: 0 - NO PAIN

## 2024-11-20 ASSESSMENT — COLUMBIA-SUICIDE SEVERITY RATING SCALE - C-SSRS
1. SINCE LAST CONTACT, HAVE YOU WISHED YOU WERE DEAD OR WISHED YOU COULD GO TO SLEEP AND NOT WAKE UP?: NO
6. HAVE YOU EVER DONE ANYTHING, STARTED TO DO ANYTHING, OR PREPARED TO DO ANYTHING TO END YOUR LIFE?: NO
2. HAVE YOU ACTUALLY HAD ANY THOUGHTS OF KILLING YOURSELF?: NO

## 2024-11-20 NOTE — NURSING NOTE
Patient belongings returned to patient and patient escorted to ED entrance by unit staff for transport.

## 2024-11-20 NOTE — CARE PLAN
Problem: Potential for Harm to Self or Others  Goal: Participates in unit activities  Outcome: Progressing  Goal: Patient/Family participate in treatment and discharge plans  Outcome: Progressing  Goal: Identifies deescalation techniques  Outcome: Progressing  Goal: Understands least restrictive measures  Outcome: Progressing  Goal: Identifies stressors that lead to harmful behaviors  Outcome: Progressing  Goal: Notifies staff when experiencing harmful thoughts toward self/others  Outcome: Progressing  Goal: Denies harm toward self or others  Outcome: Progressing  Goal: Free from restraint events  Outcome: Progressing   The patient's goals for the shift include sleep    The clinical goals for the shift include sleep    Over the shift, the patient did not make progress toward the following goals. Barriers to progression include clinical presentation. Recommendations to address these barriers include encourage medication compliance.    Pt out and visible on unit during evening. Pt reports much improved mood since beginning medications. Pt denies self harm thoughts. Pt took PRN Trazodone for c/o insomnia. Pt rested in bed most of night.

## 2024-11-20 NOTE — DISCHARGE INSTR - APPOINTMENTS
[facility] -  mental health follow up  Wednesday Dec 4th  at 1pm    Via Zoom  *They will send you a link  Call [phone], if they don't reach you.   Fax  [phone]      If you need help or meds prior to your appointment  [facility]  [address]  Walk-in M-F 8:30-4          .

## 2024-11-20 NOTE — PROGRESS NOTES
Occupational Therapy     REHAB Therapy Assessment & Treatment    Patient Name: Louise Gonzalez  MRN: [id number]  Today's Date: 11/20/2024      Activity:  Stress Management Group     Attendance:  Attendance  Activity: Discussion/reminisce  Participation: Active participation    Treatment Approach  Approach : Group therapy sessions  Patient Stated Goals: none stated  Cognition: Attention, Directions (Pt alert, oriented, & attentive. Follows complex directions independently.)  Social Skills: Demonstrates ability to listen to others, Interacts independently in social activity  Emotional: Mood (Pt mood calm, affect animated & appropriate)  Stress Management/Relaxation Training: Identifies benefits of stress management/relaxation techniques, Identifies difficulty adjusting to illness, hospitalization, or pain  Treatment Approach Comments: Pt attended & participated in morning therapy group focused on stress management. Pt educated on stress, including definition, positive vs. negative stressors, & why stress management is crucial to mental wellness. Next, pt educated on 6 components of stress management, including balanced daily routine, assertive communication, wellness, time management, challenging irrational thoughts, & relaxation/positive coping. Then, pt completed quiz, “Are You a Stress Manager or a Stress Carrier?”, & shared their results, which indicated pt has more traits of a stress carrier. Finally, pt guided through completing “Stress Bucket” activity, which includes identification & analysis of various personal components of stress management. Pt completed handout by identifying predisposing factors to their stress management, everyday stressors, major stressors, & stress relievers. Pt shared some of their stress relievers, including sense of humor, curiosity, going out in nature, & drawing. Pt educated on using the Stress Bucket & results of stress quiz to help identify where they can make the most  meaningful changes in their stress management routines. Pt receptive. Pt demonstrates good understanding.      Encounter Problems       Encounter Problems (Active)       impaired functional daily living skills       Communication/Interaction Skills (Self-expression/social Behavior/assertive) Explore/demonstrate 1-2 effective methods of expressing feelings/wants/needs (can include assertion/engaging/sharing/asking) prior to discharge (Progressing)       Start:  11/16/24    Expected End:  12/14/24            Coping Skills Explore/identify 1-2 effective strategies of expressing feelings, wants, needs(can include assertion, engaging, sharing, asking) prior to discharge (Progressing)       Start:  11/16/24    Expected End:  12/14/24             Emotion Regulation/self control Pt will exhibit appropriate range, regulation of emotions, impulse control, frustration tolerance in OT groups prior to discharge.  (Progressing)       Start:  11/16/24    Expected End:  12/14/24                 Education:  Pt given educational handouts on stress management. Reviewed & discussed. Pt demonstrates good understanding.

## 2024-11-20 NOTE — CARE PLAN
The patient's goals for the shift include Patient states her goal is to dischare home.    The clinical goals for the shift include Patient will actively participate in treatment plan.    Patient denies SI, HI, A/VH. Patients mood is anxious and depressed, affect appropriate to situation. Patient is future oriented, discharge focused. Patient is observed to be out on unit this morning interacting appropriately with peer, nursing students and staff. Patient was out to dayroom for breakfast with good appetite. Patient compliant with scheduled medication. Patient able to make needs known. Care ongoing.       Problem: Potential for Harm to Self or Others  Goal: Participates in unit activities  Outcome: Progressing  Goal: Patient/Family participate in treatment and discharge plans  Outcome: Progressing  Goal: Identifies deescalation techniques  Outcome: Progressing  Goal: Understands least restrictive measures  Outcome: Progressing  Goal: Identifies stressors that lead to harmful behaviors  Outcome: Progressing  Goal: Notifies staff when experiencing harmful thoughts toward self/others  Outcome: Progressing  Goal: Denies harm toward self or others  Outcome: Progressing  Goal: Free from restraint events  Outcome: Progressing     Problem: Educational/Scholastic Disruption  Goal: Meets educational requirements during hospitalization  Outcome: Progressing  Goal: Attends class without disruptive behavior  Outcome: Progressing  Goal: Completes daily assignments  Outcome: Progressing     Problem: Ineffective Coping  Goal: Identifies ineffective coping skills  Outcome: Progressing  Goal: Identifies healthy coping skills  Outcome: Progressing  Goal: Demonstrates healthy coping skills  Outcome: Progressing  Goal: Participates in unit activities  Outcome: Progressing     Problem: Alteration in Sleep  Goal: STG - Reports nightly sleep, duration, and quality  Outcome: Progressing  Goal: STG - Identifies sleep hygiene aids  Outcome:  Progressing  Goal: STG - Informs staff if unable to sleep  Outcome: Progressing     Problem: Anxiety  Goal: Attempts to manage anxiety with help  Outcome: Progressing  Goal: Verbalizes ways to manage anxiety  Outcome: Progressing  Goal: Implements measures to reduce anxiety  Outcome: Progressing     Problem: Self Care Deficit  Goal: STG - Patient completes hygiene  Outcome: Progressing  Goal: Increase group attendance  Outcome: Progressing  Goal: Accepts need for medications  Outcome: Progressing  Goal: STG - Goes to and eats meals independently in dining room 100% of time  Outcome: Progressing     Problem: Defensive Coping  Goal: Discusses and identifies healthy coping skills  Outcome: Progressing  Goal: Demonstrates healthy coping skills  Outcome: Progressing  Goal: Identifies appropriate social interaction  Outcome: Progressing  Goal: Demonstrates appropriate social interactions  Outcome: Progressing     Problem: Risk for Suicide  Goal: Accepts medications as prescribed/needed this shift  Outcome: Progressing  Goal: Identifies supports this shift  Outcome: Progressing  Goal: Makes needs known through verbalization or behaviors this shift  Outcome: Progressing  Goal: No self harm this shift  Outcome: Progressing  Goal: Read Safety Guidelines this shift  Outcome: Progressing  Goal: Complete Mental Health Safety Plan (psychiatry only) this shift  Outcome: Progressing

## 2024-11-20 NOTE — DISCHARGE SUMMARY
Discharge Diagnosis:  Depression    Hospital Course:  Patient is a 24-year-old female with a history of MDD who was admitted to [facility] for suicide attempt. Due to acutely elevated and imminent risk for self-harm/harm to others, patient required a level of care equivalent to inpatient hospitalization for safety, evaluation, treatment and stabilization.     The patient was admitted to [facility] 5[facility] under the care of Dr. Lewis, restricted to the marin and placed on suicide, behavior and elopement precautions.  At the beginning of hospitalization, patient presented to the ED reporting that she had tried to drown herself in the bathtub.  Patient states that she had been feeling depressed for the past 2 months.  Patient reported prior suicide attempts at age 21 and 22.  Upon assessment on 5 W. patient stated that she had not been working for the past month and was at risk of getting infected.  Patient reports she lives by herself, her only support is her girlfriend.  Patient originally from [place], where most her family resides.  Patient previously prescribed fluoxetine, did not believe it was successful in treating her depression.  Denied any alcohol or substance use.    The treatment team made the following interventions: medication, group/milieu therapy, individual therapy    Over the course of hospitalization, patient reported improvement and objective signs of improvement were noted by staff.  Patient reported improved mood and sleep.  Patient was an active participant in group/individual therapy sessions on the unit.  Prior to discharge patient future-oriented, looking forward to spending time with her girlfriend.  Patient understood the importance of following up with outpatient psych services and maintain medication compliance.    Psychiatric Medications: Wellbutrin  mg oral daily.  Patient tolerated medications without side effects.    Prior to the date of discharge, patient was  able to contract for safety and stated they felt safe and appropriate for discharge.  The treatment team found the patient not to be an imminent danger to self or others.  The patient denied suicidal or homicidal ideation and did not endorse auditory and visual hallucinations.  The patient's condition at the time of discharge was stable and initial symptoms improved over the course of hospitalization.    The patient was discharged home under the supervision of family with a 30-day supply of Wellbutrin  mg oral daily.      The patient was instructed to call the patient's outpatient provider in the event of worsening symptoms or medication side effects.  Should the patient be unable to maintain their personal safety or the safety of others, instructions were provided to dial 9-1-1 or go to the closest emergency room.    Discharge Mental Status Exam:  General:  Patient is awake, alert, and oriented to person, place, time, and situation.    Appearance:  Appears well-hydrated, well-nourished, and well-groomed and approximately stated age.   Attitude:  Patient was calm and cooperative throughout the interview, which is appropriate to the context of the interview and the topics discussed.   Behavior:  Eye contact is appropriate with topics of discussion.   Motor Activity:  Motor activity is normal. No psychomotor disturbances or abnormal involuntary movements were noted, including psychomotor agitation, psychomotor retardation, involuntary movements, extrapyramidal symptoms, akathisia, or tardive dyskinesia. Gait is normal.   Speech:  Speech is spontaneous, coherent, fluent and of appropriate quantity, rate, volume, and tone and non-vulgar/vulgar.  Speech and mannerisms are consistent with topics of discussion.   Affect:  Euthymic with a full range, mood congruent and appropriate to content.   Thought Process:  Thought process was linear, organized, and goal-directed and devoid of loose associations, flight of ideas,  thought blocking or tangents.   Thought Content:  Thought content was devoid of suicidal ideation or intent, homicidal ideation or intent, self-harm ideation or intent, delusions, illusions, obsessions, or paranoia.   Thought Perception:  Did not endorse auditory or visual hallucinations. Patient did not appear to be internally distracted or preoccupied.   Cognition:  Knowledge and intelligence are believed to be average.  Recent and remote memory, fund of knowledge, and abstract reasoning appear grossly intact, appropriate for age and education, and there are no impairments in attention, concentration, or language.   Insight:  Insight regarding psychiatric conditions is good.   Judgment:  Judgment is good.    Recent Labs:  No results found for this or any previous visit (from the past 24 hours).     Risk Assessment at Discharge:  Violence Risk Assessment: major mental illness  Acute Risk of Harm to Others is Considered: low   Risk Mitigated by: Adherence to treatment, strong therapeutic alliance. Follow-up.    Suicide Risk Assessment: current psychiatric illness and living alone or lack of social support  Protective Factors against Suicide: adherence to  treatment, hopefulness/future orientation, positive family relationships, and sense of responsibility toward family  Acute Risk of Harm to Self is Considered: low  Risk Mitigated by: Adherence to treatment, strong therapeutic alliance. Follow-up.      Esteban Villarreal, APRN-CNP